# Patient Record
Sex: MALE | Race: BLACK OR AFRICAN AMERICAN | NOT HISPANIC OR LATINO
[De-identification: names, ages, dates, MRNs, and addresses within clinical notes are randomized per-mention and may not be internally consistent; named-entity substitution may affect disease eponyms.]

---

## 2017-04-10 ENCOUNTER — APPOINTMENT (OUTPATIENT)
Dept: GASTROENTEROLOGY | Facility: CLINIC | Age: 62
End: 2017-04-10

## 2017-06-01 ENCOUNTER — RESULT REVIEW (OUTPATIENT)
Age: 62
End: 2017-06-01

## 2018-04-09 ENCOUNTER — EMERGENCY (EMERGENCY)
Facility: HOSPITAL | Age: 63
LOS: 1 days | Discharge: HOME | End: 2018-04-09
Attending: EMERGENCY MEDICINE | Admitting: EMERGENCY MEDICINE
Payer: COMMERCIAL

## 2018-04-09 VITALS
RESPIRATION RATE: 17 BRPM | SYSTOLIC BLOOD PRESSURE: 138 MMHG | TEMPERATURE: 98 F | OXYGEN SATURATION: 95 % | HEART RATE: 55 BPM | DIASTOLIC BLOOD PRESSURE: 80 MMHG

## 2018-04-09 VITALS
SYSTOLIC BLOOD PRESSURE: 142 MMHG | HEART RATE: 70 BPM | DIASTOLIC BLOOD PRESSURE: 88 MMHG | RESPIRATION RATE: 16 BRPM | HEIGHT: 70 IN | WEIGHT: 210.1 LBS | OXYGEN SATURATION: 98 % | TEMPERATURE: 98 F

## 2018-04-09 LAB
ALBUMIN SERPL ELPH-MCNC: 4.1 G/DL — SIGNIFICANT CHANGE UP (ref 3.3–5)
ALP SERPL-CCNC: 78 U/L — SIGNIFICANT CHANGE UP (ref 40–120)
ALT FLD-CCNC: 30 U/L — SIGNIFICANT CHANGE UP (ref 12–78)
ANION GAP SERPL CALC-SCNC: 8 MMOL/L — SIGNIFICANT CHANGE UP (ref 5–17)
AST SERPL-CCNC: 29 U/L — SIGNIFICANT CHANGE UP (ref 15–37)
BASOPHILS # BLD AUTO: 0.1 K/UL — SIGNIFICANT CHANGE UP (ref 0–0.2)
BASOPHILS NFR BLD AUTO: 1.7 % — SIGNIFICANT CHANGE UP (ref 0–2)
BILIRUB SERPL-MCNC: 0.3 MG/DL — SIGNIFICANT CHANGE UP (ref 0.2–1.2)
BUN SERPL-MCNC: 20 MG/DL — SIGNIFICANT CHANGE UP (ref 7–23)
CALCIUM SERPL-MCNC: 8.5 MG/DL — SIGNIFICANT CHANGE UP (ref 8.5–10.1)
CHLORIDE SERPL-SCNC: 105 MMOL/L — SIGNIFICANT CHANGE UP (ref 96–108)
CK MB BLD-MCNC: 0.6 % — SIGNIFICANT CHANGE UP (ref 0–3.5)
CK MB BLD-MCNC: 0.6 % — SIGNIFICANT CHANGE UP (ref 0–3.5)
CK MB CFR SERPL CALC: 2.4 NG/ML — SIGNIFICANT CHANGE UP (ref 0–3.6)
CK MB CFR SERPL CALC: 2.6 NG/ML — SIGNIFICANT CHANGE UP (ref 0–3.6)
CK SERPL-CCNC: 428 U/L — HIGH (ref 26–308)
CK SERPL-CCNC: 455 U/L — HIGH (ref 26–308)
CO2 SERPL-SCNC: 26 MMOL/L — SIGNIFICANT CHANGE UP (ref 22–31)
CREAT SERPL-MCNC: 1.4 MG/DL — HIGH (ref 0.5–1.3)
D DIMER BLD IA.RAPID-MCNC: <150 NG/ML DDU — SIGNIFICANT CHANGE UP
EOSINOPHIL # BLD AUTO: 0.1 K/UL — SIGNIFICANT CHANGE UP (ref 0–0.5)
EOSINOPHIL NFR BLD AUTO: 3.2 % — SIGNIFICANT CHANGE UP (ref 0–6)
GLUCOSE SERPL-MCNC: 83 MG/DL — SIGNIFICANT CHANGE UP (ref 70–99)
HCT VFR BLD CALC: 45.5 % — SIGNIFICANT CHANGE UP (ref 39–50)
HGB BLD-MCNC: 15 G/DL — SIGNIFICANT CHANGE UP (ref 13–17)
LYMPHOCYTES # BLD AUTO: 1.7 K/UL — SIGNIFICANT CHANGE UP (ref 1–3.3)
LYMPHOCYTES # BLD AUTO: 41.1 % — SIGNIFICANT CHANGE UP (ref 13–44)
MCHC RBC-ENTMCNC: 30.7 PG — SIGNIFICANT CHANGE UP (ref 27–34)
MCHC RBC-ENTMCNC: 33.1 GM/DL — SIGNIFICANT CHANGE UP (ref 32–36)
MCV RBC AUTO: 92.7 FL — SIGNIFICANT CHANGE UP (ref 80–100)
MONOCYTES # BLD AUTO: 0.6 K/UL — SIGNIFICANT CHANGE UP (ref 0–0.9)
MONOCYTES NFR BLD AUTO: 13.3 % — HIGH (ref 1–9)
NEUTROPHILS # BLD AUTO: 1.7 K/UL — LOW (ref 1.8–7.4)
NEUTROPHILS NFR BLD AUTO: 40.7 % — LOW (ref 43–77)
PLATELET # BLD AUTO: 174 K/UL — SIGNIFICANT CHANGE UP (ref 150–400)
POTASSIUM SERPL-MCNC: 3.8 MMOL/L — SIGNIFICANT CHANGE UP (ref 3.5–5.3)
POTASSIUM SERPL-SCNC: 3.8 MMOL/L — SIGNIFICANT CHANGE UP (ref 3.5–5.3)
PROT SERPL-MCNC: 7.7 G/DL — SIGNIFICANT CHANGE UP (ref 6–8.3)
RBC # BLD: 4.9 M/UL — SIGNIFICANT CHANGE UP (ref 4.2–5.8)
RBC # FLD: 13.6 % — SIGNIFICANT CHANGE UP (ref 10.3–14.5)
SODIUM SERPL-SCNC: 139 MMOL/L — SIGNIFICANT CHANGE UP (ref 135–145)
TROPONIN I SERPL-MCNC: <.015 NG/ML — SIGNIFICANT CHANGE UP (ref 0.01–0.04)
TROPONIN I SERPL-MCNC: <.015 NG/ML — SIGNIFICANT CHANGE UP (ref 0.01–0.04)
WBC # BLD: 4.2 K/UL — SIGNIFICANT CHANGE UP (ref 3.8–10.5)
WBC # FLD AUTO: 4.2 K/UL — SIGNIFICANT CHANGE UP (ref 3.8–10.5)

## 2018-04-09 PROCEDURE — 99285 EMERGENCY DEPT VISIT HI MDM: CPT

## 2018-04-09 PROCEDURE — 71275 CT ANGIOGRAPHY CHEST: CPT | Mod: 26

## 2018-04-09 PROCEDURE — 84484 ASSAY OF TROPONIN QUANT: CPT

## 2018-04-09 PROCEDURE — 71046 X-RAY EXAM CHEST 2 VIEWS: CPT

## 2018-04-09 PROCEDURE — 93010 ELECTROCARDIOGRAM REPORT: CPT

## 2018-04-09 PROCEDURE — 82553 CREATINE MB FRACTION: CPT

## 2018-04-09 PROCEDURE — 80053 COMPREHEN METABOLIC PANEL: CPT

## 2018-04-09 PROCEDURE — 71275 CT ANGIOGRAPHY CHEST: CPT

## 2018-04-09 PROCEDURE — 85027 COMPLETE CBC AUTOMATED: CPT

## 2018-04-09 PROCEDURE — 71046 X-RAY EXAM CHEST 2 VIEWS: CPT | Mod: 26

## 2018-04-09 PROCEDURE — 36415 COLL VENOUS BLD VENIPUNCTURE: CPT

## 2018-04-09 PROCEDURE — 99284 EMERGENCY DEPT VISIT MOD MDM: CPT

## 2018-04-09 PROCEDURE — 99284 EMERGENCY DEPT VISIT MOD MDM: CPT | Mod: 25

## 2018-04-09 PROCEDURE — 93005 ELECTROCARDIOGRAM TRACING: CPT

## 2018-04-09 PROCEDURE — 82550 ASSAY OF CK (CPK): CPT

## 2018-04-09 PROCEDURE — 85379 FIBRIN DEGRADATION QUANT: CPT

## 2018-04-09 RX ORDER — SODIUM CHLORIDE 9 MG/ML
1000 INJECTION INTRAMUSCULAR; INTRAVENOUS; SUBCUTANEOUS ONCE
Qty: 0 | Refills: 0 | Status: COMPLETED | OUTPATIENT
Start: 2018-04-09 | End: 2018-04-09

## 2018-04-09 RX ORDER — AZITHROMYCIN 500 MG/1
1 TABLET, FILM COATED ORAL
Qty: 4 | Refills: 0
Start: 2018-04-09 | End: 2018-04-12

## 2018-04-09 RX ORDER — ASPIRIN/CALCIUM CARB/MAGNESIUM 324 MG
325 TABLET ORAL ONCE
Qty: 0 | Refills: 0 | Status: COMPLETED | OUTPATIENT
Start: 2018-04-09 | End: 2018-04-09

## 2018-04-09 RX ORDER — AZITHROMYCIN 500 MG/1
500 TABLET, FILM COATED ORAL ONCE
Qty: 0 | Refills: 0 | Status: COMPLETED | OUTPATIENT
Start: 2018-04-09 | End: 2018-04-09

## 2018-04-09 RX ORDER — SODIUM CHLORIDE 9 MG/ML
3 INJECTION INTRAMUSCULAR; INTRAVENOUS; SUBCUTANEOUS ONCE
Qty: 0 | Refills: 0 | Status: COMPLETED | OUTPATIENT
Start: 2018-04-09 | End: 2018-04-09

## 2018-04-09 RX ADMIN — Medication 325 MILLIGRAM(S): at 18:56

## 2018-04-09 RX ADMIN — AZITHROMYCIN 500 MILLIGRAM(S): 500 TABLET, FILM COATED ORAL at 22:49

## 2018-04-09 RX ADMIN — SODIUM CHLORIDE 1000 MILLILITER(S): 9 INJECTION INTRAMUSCULAR; INTRAVENOUS; SUBCUTANEOUS at 18:00

## 2018-04-09 RX ADMIN — SODIUM CHLORIDE 3 MILLILITER(S): 9 INJECTION INTRAMUSCULAR; INTRAVENOUS; SUBCUTANEOUS at 17:35

## 2018-04-09 NOTE — CONSULT NOTE ADULT - ASSESSMENT
62 M w hx of HTN with nonanginal CP.   - The patient has symptoms of atypical chest pain. The patient's chest pain is unlikely related to an acute coronary syndrome. Trend Ce. The EKG does not have any significant ischemic changes. No signs of volume overload.    - Cont home medications at BP and HR are controlled.   - At this point, the patient can be discharged from a cardiac standpoint if [place] are negative and will followup as an outpt  with his cardiologist for further cardiac testing.

## 2018-04-09 NOTE — ED ADULT NURSE NOTE - OBJECTIVE STATEMENT
Received the patient in the Er. patient is alert and oriented. skin warm and dry. C/O chest pain since yesterday with some back pain.

## 2018-04-09 NOTE — ED PROVIDER NOTE - PROGRESS NOTE DETAILS
Pt seen by reuben Giordano to dc home if 9pm set CE ok. Pt will fu in office. At length discussion with patient regarding nature of the chest pain. Discussed results of all diagnostic testing in ED. Discussed chest pain precautions and instructions. Patient demonstrates understanding that a cardiac cause of the chest pain has not been totally excluded, but at this time there is no evidence to warrant an inpatient workup.  Discussed the importance of continued follow-up with Cardiology as outpatient to complete cardiac workup.   All results were explained to patient, including CT chest findings, need for outpt repeat testing with PMD.  and a copy of all available results given.

## 2018-04-09 NOTE — ED PROVIDER NOTE - OBJECTIVE STATEMENT
61 yo M p/w developed CP yeaterday am ~ 10 am at rest. Pain waxes /wains but never resolved since onset. NO sob. no palp/dizzy. no URRUTIA / easy fatigue. no numb/ting/focal weak. no recent URRUTIA / easy fatigue. no recent travel / immob. NO agg/allev factors. NO recent trauma. NO leg pain / swelling. No neuro sx. No agg/allev factors. No other inj or co.

## 2018-04-09 NOTE — ED PROVIDER NOTE - CHPI ED SYMPTOMS NEG
no dizziness/no fever/no cough/no chills/no syncope/no diaphoresis/no nausea/no vomiting/no shortness of breath

## 2018-04-09 NOTE — CONSULT NOTE ADULT - SUBJECTIVE AND OBJECTIVE BOX
CHIEF COMPLAINT: Patient is a 62y old  Male who presents with a chief complaint of chest pain    HPI: 62 M w hx of HTN presents for chest pain. He had back pain yesterday in that radiated to the chest and resolved after taking NSAIDs. This morning had intermittent midsternal nonradiating chest pain, nonexertional, self limiting lasting for minutes. became concerned so came to ED. Has not had any recurrent symptoms.   Denies any  dyspnea, palpitations, PND, orthopnea, near syncope, syncope, lower extremity edema, stroke like symptoms.   Has a cardiologist in [place]. No recent ischemic eval    EKG: SR with nonspecific T wave changes    REVIEW OF SYSTEMS:   All other review of systems are negative unless indicated above    PAST MEDICAL & SURGICAL HISTORY:  Hypertension      SOCIAL HISTORY:  No tobacco, ethanol, or drug abuse.    FAMILY HISTORY:    No family history of acute MI or sudden cardiac death.           Allergies    gentamicin (Unknown)    Intolerances        Home meds:  Home Medications:  aspirin:  orally once a day (09 Apr 2018 17:29)  Bystolic:  orally once a day (09 Apr 2018 17:29)  NIFEdipine:  orally once a day (09 Apr 2018 17:29)        VITAL SIGNS:   Vital Signs Last 24 Hrs  T(C): 36.7 (09 Apr 2018 17:26), Max: 36.7 (09 Apr 2018 17:26)  T(F): 98 (09 Apr 2018 17:26), Max: 98 (09 Apr 2018 17:26)  HR: 74 (09 Apr 2018 18:57) (70 - 74)  BP: 143/83 (09 Apr 2018 18:57) (130/60 - 143/83)  BP(mean): --  RR: 16 (09 Apr 2018 17:26) (16 - 16)  SpO2: 98% (09 Apr 2018 17:26) (98% - 98%)    I&O's Summary      On Exam:     Constitutional: NAD, awake and alert, well-developed  HEENT: Moist Mucous Membranes, Anicteric  Pulmonary: Non-labored, breath sounds are clear bilaterally, No wheezing, rales or rhonchi  Cardiovascular: Regular, S1 and S2, No murmurs, rubs, gallops or clicks  Gastrointestinal: Bowel Sounds present, soft, nontender.   Lymph: No peripheral edema. No lymphadenopathy.  Skin: No visible rashes or ulcers.  Psych:  Mood & affect appropriate    LABS: All Labs Reviewed:                        15.0   4.2   )-----------( 174      ( 09 Apr 2018 17:51 )             45.5     09 Apr 2018 17:51    139    |  105    |  20     ----------------------------<  83     3.8     |  26     |  1.40     Ca    8.5        09 Apr 2018 17:51    TPro  7.7    /  Alb  4.1    /  TBili  0.3    /  DBili  x      /  AST  29     /  ALT  30     /  AlkPhos  78     09 Apr 2018 17:51      CARDIAC MARKERS ( 09 Apr 2018 17:51 )  <.015 ng/mL / x     / 455 U/L / x     / 2.6 ng/mL      Blood Culture:         RADIOLOGY:    < from: CT Angio Chest w/ IV Cont (04.09.18 @ 19:19) >    EXAM:  CT ANGIO CHEST (W)AW IC                            PROCEDURE DATE:  04/09/2018          INTERPRETATION:  CLINICAL INFORMATION:  Chest pain radiating to back.    PROCEDURE:  Using multislice helical technique,  CT angiography, 1.5 mm   sections were obtained  following the intravenous administration of 64   ccs of Omnipaque 350.  Multiplanar MIP reconstructed images were obtained.    FINDINGS:      There is no evidence of intraluminal filling defects to suggest central   or interlobar pulmonary emboli.   No segmental pulmonary artery filling defect is noted.    There is arteriosclerotic calcification of the thoracic aorta with   ectasia of the ascending aorta measuring up to 4 cm.  There is coronary artery calcification.  No pericardial effusion is noted.    No enlarged mediastinal or hilar lymphadenopathy is noted.    There is a small, poorly defined groundglass opacity in the anterior left   upper lobe. Finding may represent sequelae of inflammatory or infectious   airway disease.  No lobar lung consolidation pleural effusion or pneumothorax is noted.  There is minimal scarring/fibrosis left lung base.    The central airways remain patent.    There are multilevel degenerative changes of the thoracic spine.    Impression:    No CT evidence for acute pulmonary embolism.    Small groundglass opacity left upper lobe.  Recommend short interval follow-up chest CT examination in 12 weeks.    Other findings as discussed above.                          MIROSLAVA MCKEON M.D., ATTENDING RADIOLOGIST  This document has been electronically signed. Apr 9 2018  7:38PM                < end of copied text >

## 2018-04-27 ENCOUNTER — APPOINTMENT (OUTPATIENT)
Dept: CARDIOLOGY | Facility: CLINIC | Age: 63
End: 2018-04-27
Payer: COMMERCIAL

## 2018-04-27 ENCOUNTER — NON-APPOINTMENT (OUTPATIENT)
Age: 63
End: 2018-04-27

## 2018-04-27 VITALS
WEIGHT: 211 LBS | SYSTOLIC BLOOD PRESSURE: 126 MMHG | OXYGEN SATURATION: 96 % | HEIGHT: 70 IN | HEART RATE: 52 BPM | BODY MASS INDEX: 30.21 KG/M2 | DIASTOLIC BLOOD PRESSURE: 85 MMHG

## 2018-04-27 PROCEDURE — 99215 OFFICE O/P EST HI 40 MIN: CPT

## 2018-04-27 PROCEDURE — 93000 ELECTROCARDIOGRAM COMPLETE: CPT

## 2018-04-27 RX ORDER — AZITHROMYCIN 250 MG/1
250 TABLET, FILM COATED ORAL
Qty: 4 | Refills: 0 | Status: DISCONTINUED | COMMUNITY
Start: 2018-04-09

## 2018-04-27 RX ORDER — CLOBETASOL PROPIONATE 0.5 MG/G
0.05 CREAM TOPICAL
Qty: 60 | Refills: 0 | Status: DISCONTINUED | COMMUNITY
Start: 2017-09-26

## 2018-04-27 RX ORDER — BETAMETHASONE DIPROPIONATE 0.5 MG/G
0.05 OINTMENT TOPICAL
Qty: 45 | Refills: 0 | Status: DISCONTINUED | COMMUNITY
Start: 2017-09-27

## 2018-04-27 RX ORDER — CLOTRIMAZOLE 10 MG/G
1 CREAM TOPICAL
Qty: 45 | Refills: 0 | Status: DISCONTINUED | COMMUNITY
Start: 2017-09-26

## 2018-05-16 ENCOUNTER — APPOINTMENT (OUTPATIENT)
Dept: CARDIOLOGY | Facility: CLINIC | Age: 63
End: 2018-05-16
Payer: COMMERCIAL

## 2018-05-16 PROCEDURE — 93306 TTE W/DOPPLER COMPLETE: CPT

## 2018-08-22 ENCOUNTER — RX RENEWAL (OUTPATIENT)
Age: 63
End: 2018-08-22

## 2018-08-22 RX ORDER — NEBIVOLOL HYDROCHLORIDE 10 MG/1
10 TABLET ORAL DAILY
Qty: 90 | Refills: 3 | Status: ACTIVE | COMMUNITY
Start: 2017-07-17 | End: 1900-01-01

## 2018-08-22 RX ORDER — ROSUVASTATIN CALCIUM 20 MG/1
20 TABLET, FILM COATED ORAL DAILY
Qty: 90 | Refills: 3 | Status: ACTIVE | COMMUNITY
Start: 2017-11-16 | End: 1900-01-01

## 2018-09-27 ENCOUNTER — APPOINTMENT (OUTPATIENT)
Dept: CARDIOLOGY | Facility: CLINIC | Age: 63
End: 2018-09-27

## 2018-11-01 ENCOUNTER — APPOINTMENT (OUTPATIENT)
Dept: CARDIOLOGY | Facility: CLINIC | Age: 63
End: 2018-11-01
Payer: COMMERCIAL

## 2018-11-01 ENCOUNTER — NON-APPOINTMENT (OUTPATIENT)
Age: 63
End: 2018-11-01

## 2018-11-01 VITALS
DIASTOLIC BLOOD PRESSURE: 80 MMHG | HEART RATE: 56 BPM | SYSTOLIC BLOOD PRESSURE: 129 MMHG | WEIGHT: 217 LBS | BODY MASS INDEX: 31.07 KG/M2 | HEIGHT: 70 IN | OXYGEN SATURATION: 97 %

## 2018-11-01 PROCEDURE — 93000 ELECTROCARDIOGRAM COMPLETE: CPT

## 2018-11-01 PROCEDURE — 99214 OFFICE O/P EST MOD 30 MIN: CPT

## 2018-11-01 RX ORDER — ASPIRIN 81 MG/1
81 TABLET ORAL
Refills: 3 | Status: ACTIVE | COMMUNITY
Start: 2018-11-01

## 2018-11-01 RX ORDER — ASPIRIN 325 MG/1
325 TABLET, FILM COATED ORAL DAILY
Refills: 0 | Status: DISCONTINUED | COMMUNITY
Start: 2018-04-27 | End: 2018-11-01

## 2018-11-05 LAB
BASOPHILS # BLD AUTO: 0.02 K/UL
BASOPHILS NFR BLD AUTO: 0.6 %
EOSINOPHIL # BLD AUTO: 0.23 K/UL
EOSINOPHIL NFR BLD AUTO: 6.4 %
HCT VFR BLD CALC: 46 %
HGB BLD-MCNC: 15.2 G/DL
IMM GRANULOCYTES NFR BLD AUTO: 0 %
LYMPHOCYTES # BLD AUTO: 1.55 K/UL
LYMPHOCYTES NFR BLD AUTO: 42.9 %
MAN DIFF?: NORMAL
MCHC RBC-ENTMCNC: 29.8 PG
MCHC RBC-ENTMCNC: 33 GM/DL
MCV RBC AUTO: 90.2 FL
MONOCYTES # BLD AUTO: 0.47 K/UL
MONOCYTES NFR BLD AUTO: 13 %
NEUTROPHILS # BLD AUTO: 1.34 K/UL
NEUTROPHILS NFR BLD AUTO: 37.1 %
PLATELET # BLD AUTO: 171 K/UL
RBC # BLD: 5.1 M/UL
RBC # FLD: 13.9 %
WBC # FLD AUTO: 3.61 K/UL

## 2018-11-08 LAB
ALBUMIN SERPL ELPH-MCNC: 4.5 G/DL
ALP BLD-CCNC: 87 U/L
ALT SERPL-CCNC: 20 U/L
ANION GAP SERPL CALC-SCNC: 13 MMOL/L
APPEARANCE: CLEAR
AST SERPL-CCNC: 30 U/L
BACTERIA: NEGATIVE
BILIRUB SERPL-MCNC: 0.4 MG/DL
BILIRUBIN URINE: NEGATIVE
BLOOD URINE: NEGATIVE
BUN SERPL-MCNC: 16 MG/DL
CALCIUM SERPL-MCNC: 9.6 MG/DL
CHLORIDE SERPL-SCNC: 100 MMOL/L
CHOLEST SERPL-MCNC: 137 MG/DL
CHOLEST/HDLC SERPL: 2.9 RATIO
CO2 SERPL-SCNC: 26 MMOL/L
COLOR: YELLOW
CREAT SERPL-MCNC: 1.6 MG/DL
GLUCOSE QUALITATIVE U: NEGATIVE MG/DL
GLUCOSE SERPL-MCNC: 96 MG/DL
HBA1C MFR BLD HPLC: 6 %
HDLC SERPL-MCNC: 47 MG/DL
KETONES URINE: NEGATIVE
LDLC SERPL CALC-MCNC: 76 MG/DL
LEUKOCYTE ESTERASE URINE: NEGATIVE
MICROSCOPIC-UA: NORMAL
NITRITE URINE: NEGATIVE
PH URINE: 7.5
POTASSIUM SERPL-SCNC: 4 MMOL/L
PROT SERPL-MCNC: 7.6 G/DL
PROTEIN URINE: NEGATIVE MG/DL
PSA SERPL-MCNC: 2.72 NG/ML
RED BLOOD CELLS URINE: 1 /HPF
SODIUM SERPL-SCNC: 139 MMOL/L
SPECIFIC GRAVITY URINE: 1.02
SQUAMOUS EPITHELIAL CELLS: 0 /HPF
TRIGL SERPL-MCNC: 68 MG/DL
UROBILINOGEN URINE: NEGATIVE MG/DL
WHITE BLOOD CELLS URINE: 0 /HPF

## 2019-01-10 ENCOUNTER — APPOINTMENT (OUTPATIENT)
Dept: INTERNAL MEDICINE | Facility: CLINIC | Age: 64
End: 2019-01-10
Payer: COMMERCIAL

## 2019-01-10 VITALS
HEIGHT: 70 IN | BODY MASS INDEX: 31.07 KG/M2 | SYSTOLIC BLOOD PRESSURE: 120 MMHG | OXYGEN SATURATION: 97 % | RESPIRATION RATE: 14 BRPM | TEMPERATURE: 98.3 F | WEIGHT: 217 LBS | DIASTOLIC BLOOD PRESSURE: 80 MMHG | HEART RATE: 70 BPM

## 2019-01-10 DIAGNOSIS — Z78.9 OTHER SPECIFIED HEALTH STATUS: ICD-10-CM

## 2019-01-10 DIAGNOSIS — Z00.00 ENCOUNTER FOR GENERAL ADULT MEDICAL EXAMINATION W/OUT ABNORMAL FINDINGS: ICD-10-CM

## 2019-01-10 PROCEDURE — 99386 PREV VISIT NEW AGE 40-64: CPT

## 2019-01-11 NOTE — ASSESSMENT
[FreeTextEntry1] : HTN: Controlled. On nifedipine, bystolic. \par HLD: Check lipids. On rosuvastatin. \par HCM: Check labs. Declined HIV, hep C. \par

## 2019-01-11 NOTE — HEALTH RISK ASSESSMENT
[Good] : ~his/her~  mood as  good [0] : 2) Feeling down, depressed, or hopeless: Not at all (0) [Employed] : employed [] :  [# Of Children ___] : has [unfilled] children [Fully functional (bathing, dressing, toileting, transferring, walking, feeding)] : Fully functional (bathing, dressing, toileting, transferring, walking, feeding) [Fully functional (using the telephone, shopping, preparing meals, housekeeping, doing laundry, using] : Fully functional and needs no help or supervision to perform IADLs (using the telephone, shopping, preparing meals, housekeeping, doing laundry, using transportation, managing medications and managing finances) [Smoke Detector] : smoke detector [Carbon Monoxide Detector] : carbon monoxide detector [Seat Belt] :  uses seat belt [] : No [VVE1Sbium] : 0 [HIV test declined] : HIV test declined [Change in mental status noted] : No change in mental status noted [Reports changes in hearing] : Reports no changes in hearing [Reports changes in vision] : Reports no changes in vision [de-identified] :  aer[employer]

## 2019-01-11 NOTE — REVIEW OF SYSTEMS
[Fever] : no fever [Chills] : no chills [Night Sweats] : no night sweats [Discharge] : no discharge [Vision Problems] : no vision problems [Itching] : no itching [Earache] : no earache [Nasal Discharge] : no nasal discharge [Sore Throat] : no sore throat [Chest Pain] : no chest pain [Palpitations] : no palpitations [Lower Ext Edema] : no lower extremity edema [Shortness Of Breath] : no shortness of breath [Wheezing] : no wheezing [Cough] : no cough [Dyspnea on Exertion] : not dyspnea on exertion [Abdominal Pain] : no abdominal pain [Nausea] : no nausea [Constipation] : no constipation [Diarrhea] : no diarrhea [Vomiting] : no vomiting [Melena] : no melena [Dysuria] : no dysuria [Muscle Pain] : no muscle pain [Muscle Weakness] : no muscle weakness [Mole Changes] : no mole changes [Skin Rash] : no skin rash [Headache] : no headache [Dizziness] : no dizziness [Confusion] : no confusion [Suicidal] : not suicidal [Anxiety] : no anxiety [Depression] : no depression [Easy Bleeding] : no easy bleeding [Easy Bruising] : no easy bruising [Swollen Glands] : no swollen glands

## 2019-01-14 LAB
ALBUMIN SERPL ELPH-MCNC: 4.6 G/DL
ALP BLD-CCNC: 80 U/L
ALT SERPL-CCNC: 17 U/L
ANION GAP SERPL CALC-SCNC: 10 MMOL/L
APPEARANCE: CLEAR
AST SERPL-CCNC: 25 U/L
BACTERIA: NEGATIVE
BASOPHILS # BLD AUTO: 0.02 K/UL
BASOPHILS NFR BLD AUTO: 0.5 %
BILIRUB SERPL-MCNC: 0.3 MG/DL
BILIRUBIN URINE: NEGATIVE
BLOOD URINE: NEGATIVE
BUN SERPL-MCNC: 14 MG/DL
CALCIUM SERPL-MCNC: 9 MG/DL
CHLORIDE SERPL-SCNC: 102 MMOL/L
CHOLEST SERPL-MCNC: 138 MG/DL
CHOLEST/HDLC SERPL: 2.9 RATIO
CO2 SERPL-SCNC: 27 MMOL/L
COLOR: YELLOW
CREAT SERPL-MCNC: 1.36 MG/DL
CREAT SPEC-SCNC: 119 MG/DL
CREAT/PROT UR: 0.1 RATIO
EOSINOPHIL # BLD AUTO: 0.16 K/UL
EOSINOPHIL NFR BLD AUTO: 3.9 %
GLUCOSE QUALITATIVE U: NEGATIVE MG/DL
GLUCOSE SERPL-MCNC: 92 MG/DL
HBA1C MFR BLD HPLC: 6 %
HCT VFR BLD CALC: 44.3 %
HDLC SERPL-MCNC: 47 MG/DL
HGB BLD-MCNC: 14.7 G/DL
IMM GRANULOCYTES NFR BLD AUTO: 0.2 %
KETONES URINE: NEGATIVE
LDLC SERPL CALC-MCNC: 80 MG/DL
LEUKOCYTE ESTERASE URINE: NEGATIVE
LYMPHOCYTES # BLD AUTO: 1.8 K/UL
LYMPHOCYTES NFR BLD AUTO: 44.1 %
MAN DIFF?: NORMAL
MCHC RBC-ENTMCNC: 29.9 PG
MCHC RBC-ENTMCNC: 33.2 GM/DL
MCV RBC AUTO: 90.2 FL
MICROSCOPIC-UA: NORMAL
MONOCYTES # BLD AUTO: 0.47 K/UL
MONOCYTES NFR BLD AUTO: 11.5 %
NEUTROPHILS # BLD AUTO: 1.62 K/UL
NEUTROPHILS NFR BLD AUTO: 39.8 %
NITRITE URINE: NEGATIVE
PH URINE: 7.5
PLATELET # BLD AUTO: 189 K/UL
POTASSIUM SERPL-SCNC: 4 MMOL/L
PROT SERPL-MCNC: 7.3 G/DL
PROT UR-MCNC: 11 MG/DL
PROTEIN URINE: NEGATIVE MG/DL
RBC # BLD: 4.91 M/UL
RBC # FLD: 14.1 %
RED BLOOD CELLS URINE: 2 /HPF
SODIUM SERPL-SCNC: 139 MMOL/L
SPECIFIC GRAVITY URINE: 1.02
SQUAMOUS EPITHELIAL CELLS: 0 /HPF
TRIGL SERPL-MCNC: 57 MG/DL
TSH SERPL-ACNC: 1.23 UIU/ML
UROBILINOGEN URINE: NEGATIVE MG/DL
WBC # FLD AUTO: 4.08 K/UL
WHITE BLOOD CELLS URINE: 0 /HPF

## 2019-07-22 ENCOUNTER — APPOINTMENT (OUTPATIENT)
Dept: INTERNAL MEDICINE | Facility: CLINIC | Age: 64
End: 2019-07-22
Payer: COMMERCIAL

## 2019-07-22 VITALS
SYSTOLIC BLOOD PRESSURE: 120 MMHG | OXYGEN SATURATION: 98 % | RESPIRATION RATE: 16 BRPM | DIASTOLIC BLOOD PRESSURE: 80 MMHG | WEIGHT: 216 LBS | HEART RATE: 59 BPM | HEIGHT: 70 IN | TEMPERATURE: 98.2 F | BODY MASS INDEX: 30.92 KG/M2

## 2019-07-22 DIAGNOSIS — M54.5 LOW BACK PAIN: ICD-10-CM

## 2019-07-22 PROCEDURE — 99214 OFFICE O/P EST MOD 30 MIN: CPT

## 2019-07-22 NOTE — PHYSICAL EXAM
[Grossly Normal Strength/Tone] : grossly normal strength/tone [No Acute Distress] : no acute distress [No Spinal Tenderness] : no spinal tenderness [Normal Gait] : normal gait [No Focal Deficits] : no focal deficits

## 2019-07-22 NOTE — REVIEW OF SYSTEMS
[Fever] : no fever [Chills] : no chills [Chest Pain] : no chest pain [Night Sweats] : no night sweats [Palpitations] : no palpitations [Lower Ext Edema] : no lower extremity edema [Shortness Of Breath] : no shortness of breath [Wheezing] : no wheezing [Cough] : no cough [Dyspnea on Exertion] : not dyspnea on exertion [Constipation] : no constipation [Nausea] : no nausea [Abdominal Pain] : no abdominal pain [Diarrhea] : no diarrhea [Vomiting] : no vomiting [Dysuria] : no dysuria

## 2019-07-22 NOTE — HISTORY OF PRESENT ILLNESS
[Initial Evaluation, This Episode] : an initial evaluation of this episode of [Back Pain] :  back pain [Stiffness] : stiffness [Radiating ___] : non-radiating [Left Low Back] : in the left low back [Gradual] : gradual [Aching] : aching [___ Week(s) Ago] : [unfilled] week(s) ago [Constant] : constantly [Daily] : occur daily [Moderate] : moderate in severity [Unchanged] : unchanged [Sitting] : sitting [Fever] : no fever [NSAIDs] : nonsteroidal anti-inflammatory drugs [Rash] : no rash [Leg Numbness] : no leg numbness [Leg Weakness] : no leg weakness [Saddle Paresthesia] : no saddle paresthesia [Fecal Incontinence] : no fecal incontinence [Urinary Incontinence] : no urinary incontinence

## 2019-07-22 NOTE — ASSESSMENT
[FreeTextEntry1] : Lower back pain: Most likely strain. No focal signs. Start naproxen and cyclobenzaprine. Follow-up 1 month if persistent. \par

## 2019-10-31 ENCOUNTER — APPOINTMENT (OUTPATIENT)
Dept: CARDIOLOGY | Facility: CLINIC | Age: 64
End: 2019-10-31

## 2019-10-31 DIAGNOSIS — R07.9 CHEST PAIN, UNSPECIFIED: ICD-10-CM

## 2019-10-31 NOTE — HISTORY OF PRESENT ILLNESS
[FreeTextEntry1] : This is a 64 year old man with a history of hypertension and hyperlipidemia who presents to the office for an acute visit.  Most recent blood work formed 1/19 demonstrated an A1c of 6.0. Cholesterol 138, triglycerides 57, HDL 47, and LDL 80.

## 2019-10-31 NOTE — PHYSICAL EXAM
[Normal Appearance] : normal appearance [General Appearance - In No Acute Distress] : no acute distress [Normal Conjunctiva] : the conjunctiva exhibited no abnormalities [Normal Oral Mucosa] : normal oral mucosa [Normal Jugular Venous V Waves Present] : normal jugular venous V waves present [Respiration, Rhythm And Depth] : normal respiratory rhythm and effort [Exaggerated Use Of Accessory Muscles For Inspiration] : no accessory muscle use [Auscultation Breath Sounds / Voice Sounds] : lungs were clear to auscultation bilaterally [Bowel Sounds] : normal bowel sounds [Abdomen Soft] : soft [Abdomen Tenderness] : non-tender [Abnormal Walk] : normal gait [Gait - Sufficient For Exercise Testing] : the gait was sufficient for exercise testing [Nail Clubbing] : no clubbing of the fingernails [Cyanosis, Localized] : no localized cyanosis [Petechial Hemorrhages (___cm)] : no petechial hemorrhages [Skin Color & Pigmentation] : normal skin color and pigmentation [] : no rash [No Venous Stasis] : no venous stasis [Skin Lesions] : no skin lesions [Oriented To Time, Place, And Person] : oriented to person, place, and time [Affect] : the affect was normal [Mood] : the mood was normal [No Anxiety] : not feeling anxious [Not Palpable] : not palpable [No Precordial Heave] : no precordial heave was noted [Normal Rate] : normal [Rhythm Regular] : regular [Normal S1] : normal S1 [Normal S2] : normal S2 [No Gallop] : no gallop heard [No Murmur] : no murmurs heard [2+] : left 2+ [No Abnormalities] : the abdominal aorta was not enlarged and no bruit was heard [No Pitting Edema] : no pitting edema present [FreeTextEntry1] : No JVD [Right Carotid Bruit] : no bruit heard over the right carotid [Left Carotid Bruit] : no bruit heard over the left carotid [Bruit] : no bruit heard

## 2019-10-31 NOTE — REASON FOR VISIT
[Follow-Up - Clinic] : a clinic follow-up of [Chest Pain] : chest pain [Hyperlipidemia] : hyperlipidemia [Hypertension] : hypertension [Medication Management] : Medication management

## 2021-02-23 ENCOUNTER — APPOINTMENT (OUTPATIENT)
Dept: CARDIOLOGY | Facility: CLINIC | Age: 66
End: 2021-02-23

## 2021-10-13 ENCOUNTER — RESULT REVIEW (OUTPATIENT)
Age: 66
End: 2021-10-13

## 2022-08-02 ENCOUNTER — APPOINTMENT (OUTPATIENT)
Dept: CARDIOLOGY | Facility: CLINIC | Age: 67
End: 2022-08-02

## 2022-08-02 ENCOUNTER — NON-APPOINTMENT (OUTPATIENT)
Age: 67
End: 2022-08-02

## 2022-08-02 VITALS
HEIGHT: 70 IN | SYSTOLIC BLOOD PRESSURE: 142 MMHG | WEIGHT: 219 LBS | DIASTOLIC BLOOD PRESSURE: 86 MMHG | BODY MASS INDEX: 31.35 KG/M2 | OXYGEN SATURATION: 98 % | HEART RATE: 48 BPM

## 2022-08-02 DIAGNOSIS — R60.0 LOCALIZED EDEMA: ICD-10-CM

## 2022-08-02 PROCEDURE — 99204 OFFICE O/P NEW MOD 45 MIN: CPT

## 2022-08-02 PROCEDURE — 93000 ELECTROCARDIOGRAM COMPLETE: CPT

## 2022-08-02 RX ORDER — NAPROXEN 500 MG/1
500 TABLET ORAL
Qty: 30 | Refills: 0 | Status: DISCONTINUED | COMMUNITY
Start: 2019-07-22 | End: 2022-08-02

## 2022-08-02 RX ORDER — FUROSEMIDE 40 MG/1
40 TABLET ORAL
Qty: 30 | Refills: 0 | Status: ACTIVE | COMMUNITY
Start: 2022-05-07

## 2022-08-02 RX ORDER — CYCLOBENZAPRINE HYDROCHLORIDE 5 MG/1
5 TABLET, FILM COATED ORAL
Qty: 30 | Refills: 0 | Status: DISCONTINUED | COMMUNITY
Start: 2019-07-22 | End: 2022-08-02

## 2022-08-10 ENCOUNTER — APPOINTMENT (OUTPATIENT)
Dept: CARDIOLOGY | Facility: CLINIC | Age: 67
End: 2022-08-10

## 2022-08-10 PROCEDURE — 93306 TTE W/DOPPLER COMPLETE: CPT

## 2022-09-07 ENCOUNTER — APPOINTMENT (OUTPATIENT)
Dept: UROLOGY | Facility: CLINIC | Age: 67
End: 2022-09-07

## 2022-09-07 VITALS
HEIGHT: 70 IN | WEIGHT: 218 LBS | SYSTOLIC BLOOD PRESSURE: 167 MMHG | BODY MASS INDEX: 31.21 KG/M2 | DIASTOLIC BLOOD PRESSURE: 98 MMHG | OXYGEN SATURATION: 97 % | HEART RATE: 66 BPM

## 2022-09-07 DIAGNOSIS — N40.1 BENIGN PROSTATIC HYPERPLASIA WITH LOWER URINARY TRACT SYMPMS: ICD-10-CM

## 2022-09-07 DIAGNOSIS — N13.8 BENIGN PROSTATIC HYPERPLASIA WITH LOWER URINARY TRACT SYMPMS: ICD-10-CM

## 2022-09-07 PROCEDURE — 99204 OFFICE O/P NEW MOD 45 MIN: CPT

## 2022-09-07 RX ORDER — SULFAMETHOXAZOLE AND TRIMETHOPRIM 800; 160 MG/1; MG/1
800-160 TABLET ORAL TWICE DAILY
Qty: 20 | Refills: 0 | Status: ACTIVE | COMMUNITY
Start: 2022-09-07 | End: 1900-01-01

## 2022-09-08 NOTE — END OF VISIT
[FreeTextEntry3] : Pt will trial an antibiotic with a follow up PSA and a prostate US. Plans to follow.

## 2022-09-08 NOTE — HISTORY OF PRESENT ILLNESS
[FreeTextEntry1] : 6&M presents today with a CC of a PSA elevation. Pt states that he has been having some minor genitourinary symptoms. His PSA has come up over 4. He has not been treated for urinary symptoms in the past.

## 2022-09-27 ENCOUNTER — NON-APPOINTMENT (OUTPATIENT)
Age: 67
End: 2022-09-27

## 2022-09-27 ENCOUNTER — APPOINTMENT (OUTPATIENT)
Dept: CARDIOLOGY | Facility: CLINIC | Age: 67
End: 2022-09-27

## 2022-09-27 VITALS
BODY MASS INDEX: 30.92 KG/M2 | WEIGHT: 216 LBS | OXYGEN SATURATION: 95 % | HEART RATE: 63 BPM | HEIGHT: 70 IN | DIASTOLIC BLOOD PRESSURE: 77 MMHG | SYSTOLIC BLOOD PRESSURE: 142 MMHG

## 2022-09-27 VITALS — SYSTOLIC BLOOD PRESSURE: 122 MMHG | DIASTOLIC BLOOD PRESSURE: 78 MMHG

## 2022-09-27 PROCEDURE — 93000 ELECTROCARDIOGRAM COMPLETE: CPT

## 2022-09-27 PROCEDURE — 99214 OFFICE O/P EST MOD 30 MIN: CPT

## 2022-09-27 RX ORDER — NIFEDIPINE 90 MG/1
90 TABLET, EXTENDED RELEASE ORAL DAILY
Qty: 90 | Refills: 3 | Status: DISCONTINUED | COMMUNITY
Start: 2017-07-17 | End: 2022-09-27

## 2022-09-27 RX ORDER — TELMISARTAN 40 MG/1
40 TABLET ORAL DAILY
Qty: 30 | Refills: 3 | Status: ACTIVE | COMMUNITY
Start: 2022-09-27

## 2022-09-27 NOTE — REASON FOR VISIT
[Follow-Up - Clinic] : a clinic follow-up of [Chest Pain] : chest pain [Hyperlipidemia] : hyperlipidemia [Hypertension] : hypertension [Medication Management] : Medication management [Symptom and Test Evaluation] : symptom and test evaluation

## 2022-09-28 NOTE — CARDIOLOGY SUMMARY
[___] : [unfilled] [None] : normal LV function [de-identified] : Sinus bradycardia. LAFB.  Nonspecific T wave changes.  [de-identified] : 8/2022\par EF 63%\par concentric LV remodeling, mild diastolic dysfunction\par NML RV

## 2022-09-28 NOTE — PHYSICAL EXAM
[Normal Appearance] : normal appearance [General Appearance - In No Acute Distress] : no acute distress [Normal Oral Mucosa] : normal oral mucosa [Normal Jugular Venous V Waves Present] : normal jugular venous V waves present [Respiration, Rhythm And Depth] : normal respiratory rhythm and effort [Exaggerated Use Of Accessory Muscles For Inspiration] : no accessory muscle use [Auscultation Breath Sounds / Voice Sounds] : lungs were clear to auscultation bilaterally [Bowel Sounds] : normal bowel sounds [Abdomen Soft] : soft [Abdomen Tenderness] : non-tender [Abnormal Walk] : normal gait [Gait - Sufficient For Exercise Testing] : the gait was sufficient for exercise testing [Nail Clubbing] : no clubbing of the fingernails [Cyanosis, Localized] : no localized cyanosis [Petechial Hemorrhages (___cm)] : no petechial hemorrhages [Skin Color & Pigmentation] : normal skin color and pigmentation [] : no rash [No Venous Stasis] : no venous stasis [Skin Lesions] : no skin lesions [Oriented To Time, Place, And Person] : oriented to person, place, and time [Affect] : the affect was normal [Mood] : the mood was normal [No Anxiety] : not feeling anxious [Not Palpable] : not palpable [No Precordial Heave] : no precordial heave was noted [Normal Rate] : normal [2+] : left 2+ [Well Developed] : well developed [Well Nourished] : well nourished [No Acute Distress] : no acute distress [Normal Conjunctiva] : normal conjunctiva [Normal Venous Pressure] : normal venous pressure [No Carotid Bruit] : no carotid bruit [Normal S1, S2] : normal S1, S2 [No Rub] : no rub [No Gallop] : no gallop [Rhythm Regular] : regular [Normal S1] : normal S1 [Normal S2] : normal S2 [No Murmur] : no murmurs heard [No Pitting Edema] : no pitting edema present [No Abnormalities] : the abdominal aorta was not enlarged and no bruit was heard [Clear Lung Fields] : clear lung fields [Good Air Entry] : good air entry [No Respiratory Distress] : no respiratory distress  [Soft] : abdomen soft [Non Tender] : non-tender [No Masses/organomegaly] : no masses/organomegaly [Normal Bowel Sounds] : normal bowel sounds [Normal Gait] : normal gait [No Edema] : no edema [No Cyanosis] : no cyanosis [No Clubbing] : no clubbing [No Varicosities] : no varicosities [No Rash] : no rash [No Skin Lesions] : no skin lesions [Moves all extremities] : moves all extremities [No Focal Deficits] : no focal deficits [Normal Speech] : normal speech [Alert and Oriented] : alert and oriented [Normal memory] : normal memory [FreeTextEntry1] : No JVD [Right Carotid Bruit] : no bruit heard over the right carotid [Left Carotid Bruit] : no bruit heard over the left carotid [Bruit] : no bruit heard

## 2022-09-28 NOTE — REVIEW OF SYSTEMS
[Negative] : Heme/Lymph [Lower Ext Edema] : lower extremity edema [SOB] : no shortness of breath [Dyspnea on exertion] : not dyspnea during exertion [Chest Discomfort] : no chest discomfort [Leg Claudication] : no intermittent leg claudication [Palpitations] : no palpitations [Orthopnea] : no orthopnea [Syncope] : no syncope [FreeTextEntry5] : See HPI

## 2022-09-28 NOTE — DISCUSSION/SUMMARY
[FreeTextEntry1] : This is a 67 year old man with a history of hypertension and hyperlipidemia who presents to the office for  followup.\par He is in no acute distress.  He is euvolemic on exam. ECG illustrates sinus bradycardia, unchanged from previous. His blood presssure is controlled. \par I have reviewed the most recent echocardiogram with him.  Normal LV function was demonstrated.  He has mild aortic root dilatation, 4.1 cm.  I have assured him that we will continue to monitor and will continue blood pressure control.\par It is most likely that his complaints of lower extremity swelling is related to nifedipine.  We will discontinue at this time and he will trial telmisartan 40 mg daily moving forward.\par He will go for blood work in the next 2 to 4 weeks.\par He will continue Bystolic 10 mg once a day for now. \par He will follow-up again in 1 month for blood pressure check.\par He knows to call with any questions or concerns.

## 2022-09-28 NOTE — HISTORY OF PRESENT ILLNESS
[FreeTextEntry1] : This is a 66 year old man with a history of hypertension and hyperlipidemia who presents to the office for an acute visit.   He has been noticing ankle edema at the end of the day.  IT completely resolves overnight.  He reports no other symptoms.  He denies chest pain, increased dyspnea, PND, orthopnea,  , dizziness, or syncope.  His BP is controlled when checked at home.  He monitors his HR with his Apple watch, and it is usually in the 50s.  He has no symptoms to suggest symptomatic bradycardia. \par \par \par Previous visit history as above. \par Mr Murillo presents back today after one month.\par He has completed an echocardiogram since last visit and wanted to discuss the results.  \par He has been feeling well, except he still is experiencing lower extremity swelling that he mentioned at last visit.  He feels this is due to Nifedipine and would like to change to a different blood pressure regimen. \par \par He states his LE swelling usually resolves over night , but returns as he is up and walking during the day.

## 2022-10-05 ENCOUNTER — APPOINTMENT (OUTPATIENT)
Dept: UROLOGY | Facility: CLINIC | Age: 67
End: 2022-10-05

## 2022-10-07 ENCOUNTER — APPOINTMENT (OUTPATIENT)
Dept: UROLOGY | Facility: CLINIC | Age: 67
End: 2022-10-07

## 2022-10-07 VITALS
HEIGHT: 70 IN | BODY MASS INDEX: 31.21 KG/M2 | DIASTOLIC BLOOD PRESSURE: 76 MMHG | OXYGEN SATURATION: 97 % | WEIGHT: 218 LBS | SYSTOLIC BLOOD PRESSURE: 123 MMHG | HEART RATE: 60 BPM

## 2022-10-07 PROCEDURE — 51741 ELECTRO-UROFLOWMETRY FIRST: CPT

## 2022-10-07 PROCEDURE — 76872 US TRANSRECTAL: CPT

## 2022-10-07 PROCEDURE — 76857 US EXAM PELVIC LIMITED: CPT

## 2022-10-18 ENCOUNTER — OUTPATIENT (OUTPATIENT)
Dept: OUTPATIENT SERVICES | Facility: HOSPITAL | Age: 67
LOS: 1 days | End: 2022-10-18
Payer: COMMERCIAL

## 2022-10-18 ENCOUNTER — RESULT REVIEW (OUTPATIENT)
Age: 67
End: 2022-10-18

## 2022-10-18 ENCOUNTER — TRANSCRIPTION ENCOUNTER (OUTPATIENT)
Age: 67
End: 2022-10-18

## 2022-10-18 ENCOUNTER — APPOINTMENT (OUTPATIENT)
Dept: MRI IMAGING | Facility: CLINIC | Age: 67
End: 2022-10-18

## 2022-10-18 DIAGNOSIS — R97.20 ELEVATED PROSTATE SPECIFIC ANTIGEN [PSA]: ICD-10-CM

## 2022-10-18 PROCEDURE — 76498P: CUSTOM | Mod: 26

## 2022-10-18 PROCEDURE — 72197 MRI PELVIS W/O & W/DYE: CPT

## 2022-10-18 PROCEDURE — A9585: CPT

## 2022-10-18 PROCEDURE — 72197 MRI PELVIS W/O & W/DYE: CPT | Mod: 26

## 2022-10-18 PROCEDURE — 76498 UNLISTED MR PROCEDURE: CPT

## 2022-10-25 ENCOUNTER — NON-APPOINTMENT (OUTPATIENT)
Age: 67
End: 2022-10-25

## 2022-10-25 DIAGNOSIS — R97.20 ELEVATED PROSTATE, SPECIFIC ANTIGEN [PSA]: ICD-10-CM

## 2022-10-27 ENCOUNTER — NON-APPOINTMENT (OUTPATIENT)
Age: 67
End: 2022-10-27

## 2022-10-27 ENCOUNTER — APPOINTMENT (OUTPATIENT)
Dept: CARDIOLOGY | Facility: CLINIC | Age: 67
End: 2022-10-27

## 2022-10-27 VITALS
BODY MASS INDEX: 31.21 KG/M2 | HEART RATE: 65 BPM | DIASTOLIC BLOOD PRESSURE: 50 MMHG | HEIGHT: 70 IN | SYSTOLIC BLOOD PRESSURE: 143 MMHG | WEIGHT: 218 LBS | OXYGEN SATURATION: 96 %

## 2022-10-27 VITALS — SYSTOLIC BLOOD PRESSURE: 120 MMHG | DIASTOLIC BLOOD PRESSURE: 76 MMHG

## 2022-10-27 DIAGNOSIS — I10 ESSENTIAL (PRIMARY) HYPERTENSION: ICD-10-CM

## 2022-10-27 DIAGNOSIS — E78.5 HYPERLIPIDEMIA, UNSPECIFIED: ICD-10-CM

## 2022-10-27 PROCEDURE — 93000 ELECTROCARDIOGRAM COMPLETE: CPT

## 2022-10-27 PROCEDURE — 99214 OFFICE O/P EST MOD 30 MIN: CPT

## 2022-10-27 NOTE — REASON FOR VISIT
[Symptom and Test Evaluation] : symptom and test evaluation [Follow-Up - Clinic] : a clinic follow-up of [Chest Pain] : chest pain [Hyperlipidemia] : hyperlipidemia [Hypertension] : hypertension [Medication Management] : Medication management

## 2022-10-28 ENCOUNTER — NON-APPOINTMENT (OUTPATIENT)
Age: 67
End: 2022-10-28

## 2022-11-02 NOTE — PHYSICAL EXAM
[Well Developed] : well developed [Well Nourished] : well nourished [No Acute Distress] : no acute distress [Normal Venous Pressure] : normal venous pressure [No Carotid Bruit] : no carotid bruit [Normal S1, S2] : normal S1, S2 [No Rub] : no rub [Clear Lung Fields] : clear lung fields [Good Air Entry] : good air entry [No Respiratory Distress] : no respiratory distress  [Soft] : abdomen soft [Non Tender] : non-tender [No Masses/organomegaly] : no masses/organomegaly [Normal Gait] : normal gait [Normal Bowel Sounds] : normal bowel sounds [No Edema] : no edema [No Cyanosis] : no cyanosis [No Clubbing] : no clubbing [No Varicosities] : no varicosities [No Rash] : no rash [No Skin Lesions] : no skin lesions [Moves all extremities] : moves all extremities [No Focal Deficits] : no focal deficits [Normal Speech] : normal speech [Alert and Oriented] : alert and oriented [Normal memory] : normal memory [Normal Appearance] : normal appearance [General Appearance - In No Acute Distress] : no acute distress [Normal Conjunctiva] : the conjunctiva exhibited no abnormalities [Normal Oral Mucosa] : normal oral mucosa [Normal Jugular Venous V Waves Present] : normal jugular venous V waves present [Respiration, Rhythm And Depth] : normal respiratory rhythm and effort [Exaggerated Use Of Accessory Muscles For Inspiration] : no accessory muscle use [Auscultation Breath Sounds / Voice Sounds] : lungs were clear to auscultation bilaterally [Abdomen Soft] : soft [Bowel Sounds] : normal bowel sounds [Abdomen Tenderness] : non-tender [Abnormal Walk] : normal gait [Gait - Sufficient For Exercise Testing] : the gait was sufficient for exercise testing [Nail Clubbing] : no clubbing of the fingernails [Cyanosis, Localized] : no localized cyanosis [Petechial Hemorrhages (___cm)] : no petechial hemorrhages [Skin Color & Pigmentation] : normal skin color and pigmentation [] : no rash [No Venous Stasis] : no venous stasis [Skin Lesions] : no skin lesions [Oriented To Time, Place, And Person] : oriented to person, place, and time [Affect] : the affect was normal [Mood] : the mood was normal [No Anxiety] : not feeling anxious [Not Palpable] : not palpable [No Precordial Heave] : no precordial heave was noted [Normal Rate] : normal [Rhythm Regular] : regular [Normal S1] : normal S1 [Normal S2] : normal S2 [No Gallop] : no gallop heard [No Murmur] : no murmurs heard [2+] : left 2+ [No Abnormalities] : the abdominal aorta was not enlarged and no bruit was heard [No Pitting Edema] : no pitting edema present [FreeTextEntry1] : No JVD [Right Carotid Bruit] : no bruit heard over the right carotid [Left Carotid Bruit] : no bruit heard over the left carotid [Bruit] : no bruit heard

## 2022-11-02 NOTE — DISCUSSION/SUMMARY
[FreeTextEntry1] : This is a 67 year old man with a history of hypertension and hyperlipidemia who presents to the office for  followup.\par He is in no acute distress.  He is euvolemic on exam. ECG illustrates sinus rhythm, unchanged from previous. His blood pressure is controlled. \par I have reviewed the most recent echocardiogram with him.  Normal LV function was demonstrated.  He has mild aortic root dilatation, 4.1 cm.  I have assured him that we will continue to monitor and will continue blood pressure control.  Stress testing to be done in the near future to define exercise tolerance, rule out exertional hypertensive responses, assess for exercise induced arrhythmias and rule out ischemia from obstructive CAD. \par \par He will continue off of Telmisartan and continue Nifedipine 90mg daily.  He will continue Bystolic 10mg daily. Furosemide 40mg daily as prescribed by his nephrologist.  His most recent blood work demonstrates creat 1.19>1.33\par Continue rosuvastatin.  He most recent LDL is at goal of 76.\par \par He will followup after his biopsy and any intervention that may be scheduled.\par He knows to call with any questions or concerns.

## 2022-11-02 NOTE — REVIEW OF SYSTEMS
[Negative] : Cardiovascular [SOB] : no shortness of breath [Dyspnea on exertion] : not dyspnea during exertion [Chest Discomfort] : no chest discomfort [Lower Ext Edema] : no extremity edema [Leg Claudication] : no intermittent leg claudication [Palpitations] : no palpitations [Orthopnea] : no orthopnea [PND] : no PND [Syncope] : no syncope

## 2022-11-02 NOTE — CARDIOLOGY SUMMARY
[___] : [unfilled] [None] : normal LV function [de-identified] : Sinus bradycardia. LAFB.  Nonspecific T wave changes.  [de-identified] : 8/2022\par EF 63%\par concentric LV remodeling, mild diastolic dysfunction\par NML RV

## 2022-11-02 NOTE — ADDENDUM
[FreeTextEntry1] : pt called back with nephrologist contact information\par \par Dr Mohsen Pahlavan\par \par fax, [phone]

## 2022-11-02 NOTE — HISTORY OF PRESENT ILLNESS
[FreeTextEntry1] : This is a 67 year old man with a history of hypertension and hyperlipidemia who presents to the office for an acute visit.   He has been noticing ankle edema at the end of the day.  IT completely resolves overnight.  He reports no other symptoms.  His BP is controlled when checked at home.  He monitors his HR with his Apple watch, and it is usually in the 50s.  He has no symptoms to suggest symptomatic bradycardia. \par \par Mr Murillo presents back today 10/27/22 for follow-up.  He was seen in the office 1 month ago.  Adjustments were made to his blood pressure medication regimen.  Nifedipine had been discontinued due to complaint of lower extremity swelling.Telmisartan was added to his regimen in exchange.\par He unfortunately reports"bad reaction" to the Telmisartan.  He states his blood pressure was out of control.  He would get readings close to 200s systolic.  He had also began to experience symptoms of palpitations.  He states he tried to take Telmisartan for 3 days but did not want to continue due to uncontrolled blood pressure readings.  He resumed nifedipine 90 mg daily on his own.  He states his blood pressure improved significantly.\par He has had follow-up with his nephrologist who added furosemide 40 mg to his regimen.\par \par He has no further complaints since resuming nifedipine.\par \par He is scheduled for prostate biopsy on November 11, 2022\par \par \par \par \par

## 2022-11-07 RX ORDER — ENEMA 19; 7 G/133ML; G/133ML
7-19 ENEMA RECTAL
Qty: 1 | Refills: 0 | Status: ACTIVE | COMMUNITY
Start: 2022-10-25 | End: 1900-01-01

## 2022-11-10 ENCOUNTER — NON-APPOINTMENT (OUTPATIENT)
Age: 67
End: 2022-11-10

## 2022-11-11 ENCOUNTER — APPOINTMENT (OUTPATIENT)
Dept: UROLOGY | Facility: CLINIC | Age: 67
End: 2022-11-11

## 2022-11-11 VITALS
HEIGHT: 70 IN | OXYGEN SATURATION: 96 % | SYSTOLIC BLOOD PRESSURE: 119 MMHG | WEIGHT: 218 LBS | BODY MASS INDEX: 31.21 KG/M2 | DIASTOLIC BLOOD PRESSURE: 82 MMHG | HEART RATE: 68 BPM

## 2022-11-11 PROCEDURE — 76377 3D RENDER W/INTRP POSTPROCES: CPT | Mod: 59

## 2022-11-11 PROCEDURE — 55700: CPT | Mod: 22

## 2022-11-11 PROCEDURE — 76872 US TRANSRECTAL: CPT | Mod: 59

## 2022-11-14 ENCOUNTER — NON-APPOINTMENT (OUTPATIENT)
Age: 67
End: 2022-11-14

## 2022-11-16 LAB — CORE LAB BIOPSY: NORMAL

## 2022-11-23 ENCOUNTER — APPOINTMENT (OUTPATIENT)
Dept: UROLOGY | Facility: CLINIC | Age: 67
End: 2022-11-23

## 2022-11-23 VITALS
BODY MASS INDEX: 31.92 KG/M2 | OXYGEN SATURATION: 97 % | HEART RATE: 53 BPM | WEIGHT: 223 LBS | HEIGHT: 70 IN | SYSTOLIC BLOOD PRESSURE: 126 MMHG | DIASTOLIC BLOOD PRESSURE: 79 MMHG

## 2022-11-23 PROCEDURE — 99214 OFFICE O/P EST MOD 30 MIN: CPT

## 2022-11-27 NOTE — ASSESSMENT
[FreeTextEntry1] : Pathology report:\par Target lesion: GS 7 (3+4) \par GS 6 (3+3) x 4 cores.\par \par Discussed the pathology report with patient. Discussed surgery and radiation options with patient. Surgery for a robotic radical prostatectomy discussed with patient. \par We discussed all treatment options for treatment of localized prostate cancer.   \par These included active surveillance, radiation therapy, IMRT and Cyberknife and radical prostatectomy\par We discussed the risks, benefits and complications of radiation therapy.   We discussed the option of robotic radical prostatectomy and the advantages and disadvantages.   We discussed the risks, benefits and alternatives and complications specifically impotence and incontinence.  We discussed the procedure, in hospital stay and the recovery and expectations.  We discussed my experience with this procedure and my outcomes.\par \par We discussed the cancer outcomes of each options as well.\par He wants treatment closer to home at [facility]\par He was referred to Rad Onc\par \par \par \par

## 2022-11-27 NOTE — HISTORY OF PRESENT ILLNESS
[None] : no symptoms [FreeTextEntry1] : 66 yo presents today for a follow-up for his prostate biopsy results. \par Fusion transperineal prostate biopsy 11.11.22.\par Pt. tolerated the procedure well and is w/o complications. \par PSA 4.81 (10.17.22.)\par MRI prostate 10.18.22.\par PV 60 cc\par PIRADS 4 lesion. \par Here to review his pathology report.

## 2022-12-13 ENCOUNTER — APPOINTMENT (OUTPATIENT)
Dept: SURGICAL ONCOLOGY | Facility: CLINIC | Age: 67
End: 2022-12-13

## 2022-12-14 ENCOUNTER — NON-APPOINTMENT (OUTPATIENT)
Age: 67
End: 2022-12-14

## 2022-12-14 ENCOUNTER — APPOINTMENT (OUTPATIENT)
Dept: RADIATION ONCOLOGY | Facility: CLINIC | Age: 67
End: 2022-12-14

## 2022-12-15 ENCOUNTER — LABORATORY RESULT (OUTPATIENT)
Age: 67
End: 2022-12-15

## 2022-12-15 ENCOUNTER — APPOINTMENT (OUTPATIENT)
Dept: DERMATOLOGY | Facility: CLINIC | Age: 67
End: 2022-12-15
Payer: COMMERCIAL

## 2022-12-15 DIAGNOSIS — L82.1 OTHER SEBORRHEIC KERATOSIS: ICD-10-CM

## 2022-12-15 PROCEDURE — 11102 TANGNTL BX SKIN SINGLE LES: CPT

## 2022-12-15 PROCEDURE — 99204 OFFICE O/P NEW MOD 45 MIN: CPT | Mod: 25

## 2022-12-15 RX ORDER — MUPIROCIN 20 MG/G
2 OINTMENT TOPICAL
Qty: 1 | Refills: 0 | Status: ACTIVE | COMMUNITY
Start: 2022-12-15 | End: 1900-01-01

## 2022-12-19 ENCOUNTER — APPOINTMENT (OUTPATIENT)
Dept: UROLOGY | Facility: CLINIC | Age: 67
End: 2022-12-19

## 2022-12-19 PROCEDURE — 99215 OFFICE O/P EST HI 40 MIN: CPT

## 2022-12-19 NOTE — HISTORY OF PRESENT ILLNESS
[FreeTextEntry1] : 12/19/2022: Reason for visit: Prostate Cancer Talk\par \par Mr. ESTEBAN is a 67 year old male presenting today for consideration of curative therapy for prostate cancer. He works in [employer]. He is accompanied by his wife. He reports his erection is currently inadequate. Hx of HTN. Denies Hx of DM, CAD and MI. Denies history of any abdominal surgery. The patient asked about biopsying his lymph nodes and i explained the lymph nodes are not biopsied, but a sample of the common landing sites of the landing site will be sent for analysis after the surgery. I also informed the patient that I reviewed the MRI and it showed no extraprostatic extension, seminal vesicle invasion. he is taking aspirin and I instructed him to stop a week before surgery. He reports he stopped drinking alcohol 2 weeks ago. He used to drink 3 beers a day on average. He has subsisted it with zero alcoholic beverage. I recommended him for stopping and advised him to stop to avoid complications after the surgery. He reports swelling in the lower extremities managed with furosemide. He reduced his Bystolic intake by half and now takes Bystolic 5 mg tablet one daily and  i was agreeable he keeps taking 5 mg. \par \par -PSA Screen 2.72 ng/mL on 11/02/2022\par -Clinical Stage: T2\par -Wol[place] 7 (3+4); 7/17 positive cores all on the right side. Biopsy on 11/11/2022. \par Gleasons Grade Group 2 \par -MRI on 10/18/2022 showed "Right, posterior medial (PZpm), [id number]. PIRADS 4 - High (clinically significant cancer is likely to be present). No extraprostatic extension, seminal vesicle invasion, or pelvic lymphadenopathy. Prostate Volume: 60 mL. PSA density: 0.09 ng/mL/mL". \par \par Assessment: \par \par All treatment options were reviewed. This included active surveillance, androgen deprivation, emerging options such as focal therapy/HIFU/cryotherapy, radiation options (including IMRT, SBRT, brachytherapy) and surgical options ( open vs. robotic, nerve vs non-nerve sparing.) Oncologic outcomes were compared and contrasted. Risks of biochemical and clinical recurrence discussed. Risks of needing adjuvant or salvage treatment reviewed. We discussed the the risks of secondary malignancy after radiation. We discussed the opportunity for pathological staging with survey vs other options. We discussed the possibility of positive margins, treatment failure, cancer recurrence and cancer-related death even with treatment. \par \par All potential side effects of treatment were reviewed including, but not limited to: short term or permanent stress urinary incontinence and/or short term or permanent erectile dysfunction, penile shortening, rectal symptoms/pain, perineal pain, and other side effects. \par \par All potential complications of treatment and surgery were reviewed including, but not limited to: risks of conversion from MIS to open surgery discussed, bleeding/life-threatening hemorrhage, rectal injury requiring colostomy or delayed recognition leading to fistula, vascular/bowel/adjacent visceral organ injury, trocar/access injury, the possibility of recognized vs. unrecognized/delayed- recognition injury, risks of thermal/blunt/sharp/retraction injury, risks of DVT, PE, MI, death risks of cardiopulmonary/anesthesia related complications, positional injury, infection/collection/abscess, wound complications/dehiscence/seroma/cellulitis, urinoma/fistula, uretal injury/obstruction, bladder neck contracture, penile shortening, meatal stenosis, urethral stricture, lymphocele, obstructor nerve injury, prolonged anastomotic leak, and other complications.\par \par Plan: Pt will obtain cardiac clearance from his cardiologist before surgery. Olga will contact the patient for the next steps. \par \par I counseled the patient. I discussed the various etiologies of his symptoms. Risks and alternatives were discussed. I answered the patient questions. The patient will follow-up as directed and will contact me with any questions or concerns. Thank you for the opportunity to participate in the care of this patient. I'll keep you updated on his progress.

## 2022-12-19 NOTE — DISEASE MANAGEMENT
[2] : T2 [a] : a [X] : MX [0-10] : 0 -10 ng/mL [Biopsy with Fusion] : Patient had a biopsy with fusion on [6] : Fusion Biopsy Glenroy Score: 6 [] : Patient had a Prostate MRI [4] : 4 [I] : I [BiopsyDate] : 11/11/2022 [MeasuredProstateVolume] : 60 [TotalCores] : 17 [TotalPositiveCores] : 7 [MaxCoreInvolvement] : 70

## 2022-12-27 ENCOUNTER — APPOINTMENT (OUTPATIENT)
Dept: UROLOGY | Facility: CLINIC | Age: 67
End: 2022-12-27

## 2022-12-27 PROCEDURE — 99215 OFFICE O/P EST HI 40 MIN: CPT

## 2022-12-27 NOTE — HISTORY OF PRESENT ILLNESS
[FreeTextEntry1] : Reason For Visit\par \par SHALONDA ESTEBAN is here today for consideration of curative therapy for prostate cancer. \par  \par History of Present Illness\par 12/19/2022: Reason for visit: Prostate Cancer Talk\par \par Mr. ESTEBAN is a 67 year old male presenting today for consideration of curative therapy for prostate cancer. He works in [employer] industry. He is accompanied by his wife. He reports his erection is currently inadequate. Hx of HTN. Denies Hx of DM, CAD and MI. Denies history of any abdominal surgery. The patient asked about biopsying his lymph nodes and i explained the lymph nodes are not biopsied, but a sample of the common landing sites of the landing site will be sent for analysis after the surgery. I also informed the patient that I reviewed the MRI and it showed no extraprostatic extension, seminal vesicle invasion. he is taking aspirin and I instructed him to stop a week before surgery. He reports he stopped drinking alcohol 2 weeks ago. He used to drink 3 beers a day on average. He has subsisted it with zero alcoholic beverage. I recommended him for stopping and advised him to stop to avoid complications after the surgery. He reports swelling in the lower extremities managed with furosemide. He reduced his Bystolic intake by half and now takes Bystolic 5 mg tablet one daily and i was agreeable he keeps taking 5 mg. \par \par -PSA Screen 2.72 ng/mL on 11/02/2022\par -Clinical Stage: T2\par -[place] 7 (3+4); 7/17 positive cores all on the right side. Biopsy on 11/11/2022. \par Gleasons Grade Group 2 \par -MRI on 10/18/2022 showed "Right, posterior medial (PZpm), [id number]. PIRADS 4 - High (clinically significant cancer is likely to be present). No extraprostatic extension, seminal vesicle invasion, or pelvic lymphadenopathy. Prostate Volume: 60 mL. PSA density: 0.09 ng/mL/mL". \par \par Assessment: \par \par All treatment options were reviewed. This included active surveillance, androgen deprivation, emerging options such as focal therapy/HIFU/cryotherapy, radiation options (including IMRT, SBRT, brachytherapy) and surgical options ( open vs. robotic, nerve vs non-nerve sparing.) Oncologic outcomes were compared and contrasted. Risks of biochemical and clinical recurrence discussed. Risks of needing adjuvant or salvage treatment reviewed. We discussed the the risks of secondary malignancy after radiation. We discussed the opportunity for pathological staging with survey vs other options. We discussed the possibility of positive margins, treatment failure, cancer recurrence and cancer-related death even with treatment. \par \par All potential side effects of treatment were reviewed including, but not limited to: short term or permanent stress urinary incontinence and/or short term or permanent erectile dysfunction, penile shortening, rectal symptoms/pain, perineal pain, and other side effects. \par \par All potential complications of treatment and surgery were reviewed including, but not limited to: risks of conversion from MIS to open surgery discussed, bleeding/life-threatening hemorrhage, rectal injury requiring colostomy or delayed recognition leading to fistula, vascular/bowel/adjacent visceral organ injury, trocar/access injury, the possibility of recognized vs. unrecognized/delayed- recognition injury, risks of thermal/blunt/sharp/retraction injury, risks of DVT, PE, MI, death risks of cardiopulmonary/anesthesia related complications, positional injury, infection/collection/abscess, wound complications/dehiscence/seroma/cellulitis, urinoma/fistula, uretal injury/obstruction, bladder neck contracture, penile shortening, meatal stenosis, urethral stricture, lymphocele, obstructor nerve injury, prolonged anastomotic leak, and other complications.\par \par Plan: Pt will obtain cardiac clearance from his cardiologist before surgery. Olga will contact the patient for the next steps. \par \par I counseled the patient. I discussed the various etiologies of his symptoms. Risks and alternatives were discussed. I answered the patient questions. The patient will follow-up as directed and will contact me with any questions or concerns. Thank you for the opportunity to participate in the care of this patient. I'll keep you updated on his progress. \par  \par Disease Management\par Diagnosis: \par Clinical Staging at Diagnosis: T2 a NX MX \par PSA Level at Diagnosis: 0 -10 ng/mL \par Biopsy: Patient had a biopsy with fusion on 11/11/2022. Measured Prostate Volume (mL): 60. Total number of cores at biopsy: 17. Total # of positives cores: 7. Max % Core Involvement: 70. \par Fusion Biopsy Glendale Score: 6. \par Imaging at Diagnosis:  \par Patient had a Prostate MRI. PI-RADS: 4. \par \par AJCC Staging: \par AJCC Stage (8th Ed): I. \par \par  \par Active Problems\par Acute left-sided low back pain without sciatica (724.2) (M54.50)\par Bilateral leg edema (782.3) (R60.0)\par BPH with urinary obstruction (600.01,599.69) (N40.1,N13.8)\par Chest pain (786.50) (R07.9)\par Essential hypertension (401.9) (I10)\par Hyperlipidemia (272.4) (E78.5)\par PSA elevation (790.93) (R97.20)\par \par Surgical History\par History of Hernia repair\par History of Needle Biopsy Of Prostate\par \par Family History\par Denied: Family history of acute myocardial infarction\par No pertinent family history : Mother, Father\par \par Social History\par Current non-smoker (V49.89) (Z78.9)\par Never a smoker\par Social alcohol use (Z78.9)\par \par Current Meds\par Aspirin EC 81 MG Oral Tablet Delayed Release; TAKE 1 TABLET BY MOUTH EVERY DAY\par Bystolic 10 MG Oral Tablet; TAKE 1 TABLET DAILY\par diazePAM 5 MG Oral Tablet; TAKE 1 TABLET 1 HOUR PRIOR TO PROCEDURE.\par MDD:5mg\par Fleet Enema 7-19 GM/118ML Rectal Enema; USE AS DIRECTED\par Furosemide 40 MG Oral Tablet; TAKE 1 TABLET (40 MG) BY MOUTH DAILY AS NEEDED\par WATERPILL\par Rosuvastatin Calcium 20 MG Oral Tablet; TAKE 1 TABLET DAILY\par Sulfamethoxazole-Trimethoprim 800-160 MG Oral Tablet; TAKE 1 TABLET TWICE DAILY\par UNTIL FINISHED\par Telmisartan 40 MG Oral Tablet; TAKE 1 TABLET ONCE DAILY\par \par Allergies\par gentamicin\par \par Orders\par Prostate cancer, PSA elevation \par Urology Surgery Booking Form Inpatient  .  Status: Need Information - Required\par information,Financial Authorization  Requested for: [id number]\par Priority Category : SEMI-URGENT\par Length of Procedure : 2\par Did the patient receive COVID 19 vaccine? : Yes, Proof submitted\par ERP (Enhanced Recovery Program) : Yes\par Flouro : N\par See Attached Form : Robot\par Special Equipment : Y\par PST : Required\par Medical Clearance : Cardiac\par Admission Type : SDA\par Anesthesia Alert : N\par Anesthesia Type : General\par Procedure Description: : Robotic Prostatectomy and MPLND\par Laterality : N/A\par CPT Code(s): : 49693-Yggjafm/Laparoscopic Bilateral Pelvic Lymphadenectomy\par CPT Code(s): : 80866-Hrdlekg Assisted Laparoscopic Radical Prostatectomy\par Suitable for Amb Surg? : No\par Performing Facility : [facility]\par \par 12/27/22\par F/U Questions about Bx and PSA were discussed and clarified. h e is waiting for cardiology clearance before proceeding th-wht surgery. \par \par E

## 2022-12-29 ENCOUNTER — APPOINTMENT (OUTPATIENT)
Dept: CARDIOLOGY | Facility: CLINIC | Age: 67
End: 2022-12-29

## 2023-01-05 ENCOUNTER — NON-APPOINTMENT (OUTPATIENT)
Age: 68
End: 2023-01-05

## 2023-01-10 ENCOUNTER — APPOINTMENT (OUTPATIENT)
Dept: UROLOGY | Facility: CLINIC | Age: 68
End: 2023-01-10

## 2023-01-11 ENCOUNTER — APPOINTMENT (OUTPATIENT)
Dept: UROLOGY | Facility: CLINIC | Age: 68
End: 2023-01-11

## 2023-01-12 ENCOUNTER — OUTPATIENT (OUTPATIENT)
Dept: OUTPATIENT SERVICES | Facility: HOSPITAL | Age: 68
LOS: 1 days | End: 2023-01-12
Payer: COMMERCIAL

## 2023-01-12 VITALS
WEIGHT: 220.02 LBS | TEMPERATURE: 98 F | OXYGEN SATURATION: 95 % | DIASTOLIC BLOOD PRESSURE: 84 MMHG | SYSTOLIC BLOOD PRESSURE: 144 MMHG | HEIGHT: 68.5 IN | HEART RATE: 60 BPM | RESPIRATION RATE: 18 BRPM

## 2023-01-12 DIAGNOSIS — C61 MALIGNANT NEOPLASM OF PROSTATE: ICD-10-CM

## 2023-01-12 DIAGNOSIS — Z98.890 OTHER SPECIFIED POSTPROCEDURAL STATES: Chronic | ICD-10-CM

## 2023-01-12 DIAGNOSIS — R73.09 OTHER ABNORMAL GLUCOSE: ICD-10-CM

## 2023-01-12 DIAGNOSIS — I10 ESSENTIAL (PRIMARY) HYPERTENSION: ICD-10-CM

## 2023-01-12 LAB
BLD GP AB SCN SERPL QL: NEGATIVE — SIGNIFICANT CHANGE UP
GLUCOSE SERPL-MCNC: 93 MG/DL — SIGNIFICANT CHANGE UP (ref 70–99)
HCT VFR BLD CALC: 47.1 % — SIGNIFICANT CHANGE UP (ref 39–50)
HGB BLD-MCNC: 15.4 G/DL — SIGNIFICANT CHANGE UP (ref 13–17)
MCHC RBC-ENTMCNC: 29.5 PG — SIGNIFICANT CHANGE UP (ref 27–34)
MCHC RBC-ENTMCNC: 32.7 GM/DL — SIGNIFICANT CHANGE UP (ref 32–36)
MCV RBC AUTO: 90.2 FL — SIGNIFICANT CHANGE UP (ref 80–100)
NRBC # BLD: 0 /100 WBCS — SIGNIFICANT CHANGE UP (ref 0–0)
NRBC # FLD: 0 K/UL — SIGNIFICANT CHANGE UP (ref 0–0)
PLATELET # BLD AUTO: 146 K/UL — LOW (ref 150–400)
RBC # BLD: 5.22 M/UL — SIGNIFICANT CHANGE UP (ref 4.2–5.8)
RBC # FLD: 13.6 % — SIGNIFICANT CHANGE UP (ref 10.3–14.5)
RH IG SCN BLD-IMP: POSITIVE — SIGNIFICANT CHANGE UP
WBC # BLD: 4.03 K/UL — SIGNIFICANT CHANGE UP (ref 3.8–10.5)
WBC # FLD AUTO: 4.03 K/UL — SIGNIFICANT CHANGE UP (ref 3.8–10.5)

## 2023-01-12 PROCEDURE — 93010 ELECTROCARDIOGRAM REPORT: CPT

## 2023-01-12 RX ORDER — SODIUM CHLORIDE 9 MG/ML
1000 INJECTION, SOLUTION INTRAVENOUS
Refills: 0 | Status: DISCONTINUED | OUTPATIENT
Start: 2023-01-24 | End: 2023-01-24

## 2023-01-12 NOTE — H&P PST ADULT - NSICDXPASTMEDICALHX_GEN_ALL_CORE_FT
PAST MEDICAL HISTORY:  Enlarged prostate     Hypertension     Prediabetes     Prostate cancer      PAST MEDICAL HISTORY:  Enlarged prostate     Hypertension     Obese     Prediabetes     Prostate cancer

## 2023-01-12 NOTE — H&P PST ADULT - NSANTHOSAYNRD_GEN_A_CORE
No. TALIB screening performed.  STOP BANG Legend: 0-2 = LOW Risk; 3-4 = INTERMEDIATE Risk; 5-8 = HIGH Risk

## 2023-01-12 NOTE — H&P PST ADULT - ASSESSMENT
Pt. is a 66 yo male with prostate cancer.     Repeat CBC sent due to low WBC's.  Discussed with Dr. Rodney.

## 2023-01-12 NOTE — H&P PST ADULT - MUSCULOSKELETAL
negative normal/ROM intact/normal gait/strength 5/5 bilateral upper extremities/strength 5/5 bilateral lower extremities details…

## 2023-01-12 NOTE — H&P PST ADULT - PROBLEM SELECTOR PLAN 1
Pt. is scheduled for a robotic assisted laparoscopic radical prostatectomy, robotic laparoscopic WILLIE pelvic lymphadenectomy 1/24/23.  Pt. verbalized understanding of instructions and that Chlorhexidine is for external use.

## 2023-01-12 NOTE — H&P PST ADULT - PROBLEM SELECTOR PLAN 3
Pt. is asymptomatic.  Pt. had both medical and cardiac clearance.  Both are in the chart. Will request ECHO and stress test results be faxed to PST.

## 2023-01-13 LAB
CULTURE RESULTS: SIGNIFICANT CHANGE UP
SPECIMEN SOURCE: SIGNIFICANT CHANGE UP

## 2023-01-18 RX ORDER — DIAZEPAM 5 MG/1
5 TABLET ORAL
Qty: 1 | Refills: 0 | Status: DISCONTINUED | COMMUNITY
Start: 2022-10-25 | End: 2023-01-18

## 2023-01-19 ENCOUNTER — APPOINTMENT (OUTPATIENT)
Dept: UROLOGY | Facility: CLINIC | Age: 68
End: 2023-01-19

## 2023-01-20 ENCOUNTER — NON-APPOINTMENT (OUTPATIENT)
Age: 68
End: 2023-01-20

## 2023-01-23 ENCOUNTER — TRANSCRIPTION ENCOUNTER (OUTPATIENT)
Age: 68
End: 2023-01-23

## 2023-01-24 ENCOUNTER — APPOINTMENT (OUTPATIENT)
Dept: UROLOGY | Facility: HOSPITAL | Age: 68
End: 2023-01-24

## 2023-01-24 ENCOUNTER — RESULT REVIEW (OUTPATIENT)
Age: 68
End: 2023-01-24

## 2023-01-24 ENCOUNTER — INPATIENT (INPATIENT)
Facility: HOSPITAL | Age: 68
LOS: 0 days | Discharge: HOME | End: 2023-01-25
Attending: UROLOGY | Admitting: UROLOGY
Payer: COMMERCIAL

## 2023-01-24 VITALS
HEIGHT: 68.5 IN | RESPIRATION RATE: 16 BRPM | OXYGEN SATURATION: 97 % | TEMPERATURE: 98 F | WEIGHT: 220.02 LBS | HEART RATE: 53 BPM | SYSTOLIC BLOOD PRESSURE: 134 MMHG | DIASTOLIC BLOOD PRESSURE: 87 MMHG

## 2023-01-24 DIAGNOSIS — Z98.890 OTHER SPECIFIED POSTPROCEDURAL STATES: Chronic | ICD-10-CM

## 2023-01-24 DIAGNOSIS — C61 MALIGNANT NEOPLASM OF PROSTATE: ICD-10-CM

## 2023-01-24 LAB — RH IG SCN BLD-IMP: POSITIVE — SIGNIFICANT CHANGE UP

## 2023-01-24 PROCEDURE — 88309 TISSUE EXAM BY PATHOLOGIST: CPT | Mod: 26

## 2023-01-24 PROCEDURE — 88307 TISSUE EXAM BY PATHOLOGIST: CPT | Mod: 26

## 2023-01-24 PROCEDURE — 88305 TISSUE EXAM BY PATHOLOGIST: CPT | Mod: 26

## 2023-01-24 PROCEDURE — 55866 LAPS SURG PRST8ECT RPBIC RAD: CPT

## 2023-01-24 PROCEDURE — 38571 LAPAROSCOPY LYMPHADENECTOMY: CPT

## 2023-01-24 DEVICE — LIGATING CLIPS WECK HEMOLOK POLYMER LARGE (PURPLE) 6: Type: IMPLANTABLE DEVICE | Status: FUNCTIONAL

## 2023-01-24 RX ORDER — FUROSEMIDE 40 MG
40 TABLET ORAL DAILY
Refills: 0 | Status: DISCONTINUED | OUTPATIENT
Start: 2023-01-24 | End: 2023-01-25

## 2023-01-24 RX ORDER — NIFEDIPINE 30 MG
90 TABLET, EXTENDED RELEASE 24 HR ORAL
Qty: 0 | Refills: 0 | DISCHARGE

## 2023-01-24 RX ORDER — ERGOCALCIFEROL 1.25 MG/1
1 CAPSULE ORAL
Qty: 0 | Refills: 0 | DISCHARGE

## 2023-01-24 RX ORDER — ROSUVASTATIN CALCIUM 5 MG/1
1 TABLET ORAL
Qty: 0 | Refills: 0 | DISCHARGE

## 2023-01-24 RX ORDER — HYDROMORPHONE HYDROCHLORIDE 2 MG/ML
0.5 INJECTION INTRAMUSCULAR; INTRAVENOUS; SUBCUTANEOUS
Refills: 0 | Status: DISCONTINUED | OUTPATIENT
Start: 2023-01-24 | End: 2023-01-24

## 2023-01-24 RX ORDER — SODIUM CHLORIDE 9 MG/ML
1000 INJECTION, SOLUTION INTRAVENOUS
Refills: 0 | Status: DISCONTINUED | OUTPATIENT
Start: 2023-01-24 | End: 2023-01-25

## 2023-01-24 RX ORDER — OXYBUTYNIN CHLORIDE 5 MG
5 TABLET ORAL THREE TIMES A DAY
Refills: 0 | Status: DISCONTINUED | OUTPATIENT
Start: 2023-01-24 | End: 2023-01-25

## 2023-01-24 RX ORDER — ATORVASTATIN CALCIUM 80 MG/1
80 TABLET, FILM COATED ORAL AT BEDTIME
Refills: 0 | Status: DISCONTINUED | OUTPATIENT
Start: 2023-01-24 | End: 2023-01-25

## 2023-01-24 RX ORDER — LIDOCAINE 4 G/100G
1 CREAM TOPICAL THREE TIMES A DAY
Refills: 0 | Status: DISCONTINUED | OUTPATIENT
Start: 2023-01-24 | End: 2023-01-25

## 2023-01-24 RX ORDER — POTASSIUM CHLORIDE 20 MEQ
1 PACKET (EA) ORAL
Qty: 0 | Refills: 0 | DISCHARGE

## 2023-01-24 RX ORDER — MINERAL OIL
30 OIL (ML) MISCELLANEOUS DAILY
Refills: 0 | Status: DISCONTINUED | OUTPATIENT
Start: 2023-01-24 | End: 2023-01-25

## 2023-01-24 RX ORDER — NEBIVOLOL HYDROCHLORIDE 5 MG/1
10 TABLET ORAL DAILY
Refills: 0 | Status: DISCONTINUED | OUTPATIENT
Start: 2023-01-24 | End: 2023-01-25

## 2023-01-24 RX ORDER — ASPIRIN/CALCIUM CARB/MAGNESIUM 324 MG
81 TABLET ORAL
Qty: 0 | Refills: 0 | DISCHARGE

## 2023-01-24 RX ORDER — HYDROMORPHONE HYDROCHLORIDE 2 MG/ML
2 INJECTION INTRAMUSCULAR; INTRAVENOUS; SUBCUTANEOUS EVERY 4 HOURS
Refills: 0 | Status: DISCONTINUED | OUTPATIENT
Start: 2023-01-24 | End: 2023-01-25

## 2023-01-24 RX ORDER — NEBIVOLOL HYDROCHLORIDE 5 MG/1
10 TABLET ORAL
Qty: 0 | Refills: 0 | DISCHARGE

## 2023-01-24 RX ORDER — FUROSEMIDE 40 MG
1 TABLET ORAL
Qty: 0 | Refills: 0 | DISCHARGE

## 2023-01-24 RX ORDER — ACETAMINOPHEN 500 MG
1000 TABLET ORAL EVERY 8 HOURS
Refills: 0 | Status: DISCONTINUED | OUTPATIENT
Start: 2023-01-24 | End: 2023-01-25

## 2023-01-24 RX ORDER — IBUPROFEN 200 MG
400 TABLET ORAL THREE TIMES A DAY
Refills: 0 | Status: DISCONTINUED | OUTPATIENT
Start: 2023-01-24 | End: 2023-01-25

## 2023-01-24 RX ORDER — ONDANSETRON 8 MG/1
4 TABLET, FILM COATED ORAL ONCE
Refills: 0 | Status: DISCONTINUED | OUTPATIENT
Start: 2023-01-24 | End: 2023-01-24

## 2023-01-24 RX ORDER — NIFEDIPINE 30 MG
90 TABLET, EXTENDED RELEASE 24 HR ORAL DAILY
Refills: 0 | Status: DISCONTINUED | OUTPATIENT
Start: 2023-01-24 | End: 2023-01-25

## 2023-01-24 RX ORDER — POTASSIUM CHLORIDE 20 MEQ
10 PACKET (EA) ORAL DAILY
Refills: 0 | Status: DISCONTINUED | OUTPATIENT
Start: 2023-01-24 | End: 2023-01-25

## 2023-01-24 RX ADMIN — Medication 1000 MILLIGRAM(S): at 22:44

## 2023-01-24 RX ADMIN — Medication 1 TABLET(S): at 17:35

## 2023-01-24 RX ADMIN — ATORVASTATIN CALCIUM 80 MILLIGRAM(S): 80 TABLET, FILM COATED ORAL at 22:44

## 2023-01-24 RX ADMIN — SODIUM CHLORIDE 125 MILLILITER(S): 9 INJECTION, SOLUTION INTRAVENOUS at 16:52

## 2023-01-24 RX ADMIN — Medication 30 MILLILITER(S): at 22:44

## 2023-01-24 NOTE — ASU PREOP CHECKLIST - BP NONINVASIVE SYSTOLIC (MM HG)
FT Appropriate for gestational age  Encourage breast feeding  watch daily weights , feeding , voiding and stooling.  Well New Born care including Hearing screen ,  state screen , CCHD.  Iqra Hein MD  Attending Pediatric Hospitalist   [facility]/ [facility] [phone]

## 2023-01-24 NOTE — PATIENT PROFILE ADULT - FALL HARM RISK - UNIVERSAL INTERVENTIONS
Bed in lowest position, wheels locked, appropriate side rails in place/Call bell, personal items and telephone in reach/Instruct patient to call for assistance before getting out of bed or chair/Non-slip footwear when patient is out of bed/[place] to call system/Physically safe environment - no spills, clutter or unnecessary equipment/Purposeful Proactive Rounding/Room/bathroom lighting operational, light cord in reach

## 2023-01-24 NOTE — CHART NOTE - NSCHARTNOTEFT_GEN_A_CORE
Post op Check    Pt seen and examined without complaints. Pain is controlled. Denies SOB/CP/N/V.     Vital Signs Last 24 Hrs  T(C): 36.2 (24 Jan 2023 22:08), Max: 36.7 (24 Jan 2023 19:00)  T(F): 97.2 (24 Jan 2023 22:08), Max: 98.1 (24 Jan 2023 19:00)  HR: 89 (24 Jan 2023 22:08) (53 - 89)  BP: 141/92 (24 Jan 2023 22:08) (108/72 - 141/92)  BP(mean): 88 (24 Jan 2023 19:00) (79 - 94)  RR: 18 (24 Jan 2023 22:08) (12 - 20)  SpO2: 97% (24 Jan 2023 22:08) (95% - 100%)    Parameters below as of 24 Jan 2023 22:08  Patient On (Oxygen Delivery Method): room air        I&O's Summary    24 Jan 2023 07:01  -  24 Jan 2023 22:21  --------------------------------------------------------  IN: 1005 mL / OUT: 310 mL / NET: 695 mL        Physical Exam  Gen: NAD, A&Ox3  Pulm: No respiratory distress, no subcostal retractions  CV: RRR  Abd: Soft, distended, dressing applied to the incision above the umbilicus, no active bleeding  : + hardin, light pink,                 A/P: 67y Male s/p RALP, LND  DVT prophylaxis/OOB  Incentive spirometry  Strict I&O's  Analgesia and antiemetics as needed  Clear liquid diet  AM labs

## 2023-01-24 NOTE — ASU PATIENT PROFILE, ADULT - NSICDXPASTMEDICALHX_GEN_ALL_CORE_FT
PAST MEDICAL HISTORY:  Enlarged prostate     Hypertension     Obese     Prediabetes     Prostate cancer

## 2023-01-24 NOTE — ASU PATIENT PROFILE, ADULT - FALL HARM RISK - UNIVERSAL INTERVENTIONS
Bed in lowest position, wheels locked, appropriate side rails in place/Call bell, personal items and telephone in reach/Instruct patient to call for assistance before getting out of bed or chair/Non-slip footwear when patient is out of bed/[place] to call system/Physically safe environment - no spills, clutter or unnecessary equipment/Purposeful Proactive Rounding/Room/bathroom lighting operational, light cord in reach

## 2023-01-25 ENCOUNTER — TRANSCRIPTION ENCOUNTER (OUTPATIENT)
Age: 68
End: 2023-01-25

## 2023-01-25 VITALS
RESPIRATION RATE: 17 BRPM | TEMPERATURE: 97 F | OXYGEN SATURATION: 99 % | HEART RATE: 58 BPM | DIASTOLIC BLOOD PRESSURE: 74 MMHG | SYSTOLIC BLOOD PRESSURE: 135 MMHG

## 2023-01-25 DIAGNOSIS — R73.03 PREDIABETES: ICD-10-CM

## 2023-01-25 DIAGNOSIS — I10 ESSENTIAL (PRIMARY) HYPERTENSION: ICD-10-CM

## 2023-01-25 DIAGNOSIS — Z29.9 ENCOUNTER FOR PROPHYLACTIC MEASURES, UNSPECIFIED: ICD-10-CM

## 2023-01-25 DIAGNOSIS — E78.5 HYPERLIPIDEMIA, UNSPECIFIED: ICD-10-CM

## 2023-01-25 LAB
ANION GAP SERPL CALC-SCNC: 9 MMOL/L — SIGNIFICANT CHANGE UP (ref 7–14)
BUN SERPL-MCNC: 11 MG/DL — SIGNIFICANT CHANGE UP (ref 7–23)
CALCIUM SERPL-MCNC: 8.9 MG/DL — SIGNIFICANT CHANGE UP (ref 8.4–10.5)
CHLORIDE SERPL-SCNC: 102 MMOL/L — SIGNIFICANT CHANGE UP (ref 98–107)
CO2 SERPL-SCNC: 25 MMOL/L — SIGNIFICANT CHANGE UP (ref 22–31)
CREAT SERPL-MCNC: 1.22 MG/DL — SIGNIFICANT CHANGE UP (ref 0.5–1.3)
EGFR: 65 ML/MIN/1.73M2 — SIGNIFICANT CHANGE UP
GLUCOSE SERPL-MCNC: 96 MG/DL — SIGNIFICANT CHANGE UP (ref 70–99)
HCT VFR BLD CALC: 42.9 % — SIGNIFICANT CHANGE UP (ref 39–50)
HGB BLD-MCNC: 14.1 G/DL — SIGNIFICANT CHANGE UP (ref 13–17)
MCHC RBC-ENTMCNC: 29.6 PG — SIGNIFICANT CHANGE UP (ref 27–34)
MCHC RBC-ENTMCNC: 32.9 GM/DL — SIGNIFICANT CHANGE UP (ref 32–36)
MCV RBC AUTO: 89.9 FL — SIGNIFICANT CHANGE UP (ref 80–100)
NRBC # BLD: 0 /100 WBCS — SIGNIFICANT CHANGE UP (ref 0–0)
NRBC # FLD: 0 K/UL — SIGNIFICANT CHANGE UP (ref 0–0)
PLATELET # BLD AUTO: 193 K/UL — SIGNIFICANT CHANGE UP (ref 150–400)
POTASSIUM SERPL-MCNC: 3.7 MMOL/L — SIGNIFICANT CHANGE UP (ref 3.5–5.3)
POTASSIUM SERPL-SCNC: 3.7 MMOL/L — SIGNIFICANT CHANGE UP (ref 3.5–5.3)
RBC # BLD: 4.77 M/UL — SIGNIFICANT CHANGE UP (ref 4.2–5.8)
RBC # FLD: 13.2 % — SIGNIFICANT CHANGE UP (ref 10.3–14.5)
SODIUM SERPL-SCNC: 136 MMOL/L — SIGNIFICANT CHANGE UP (ref 135–145)
WBC # BLD: 8.12 K/UL — SIGNIFICANT CHANGE UP (ref 3.8–10.5)
WBC # FLD AUTO: 8.12 K/UL — SIGNIFICANT CHANGE UP (ref 3.8–10.5)

## 2023-01-25 PROCEDURE — 99222 1ST HOSP IP/OBS MODERATE 55: CPT

## 2023-01-25 RX ORDER — ACETAMINOPHEN 500 MG
2 TABLET ORAL
Qty: 0 | Refills: 0 | DISCHARGE
Start: 2023-01-25

## 2023-01-25 RX ORDER — LIDOCAINE 4 G/100G
1 CREAM TOPICAL
Qty: 0 | Refills: 0 | DISCHARGE
Start: 2023-01-25

## 2023-01-25 RX ORDER — IBUPROFEN 200 MG
1 TABLET ORAL
Qty: 0 | Refills: 0 | DISCHARGE
Start: 2023-01-25

## 2023-01-25 RX ORDER — HYDROMORPHONE HYDROCHLORIDE 2 MG/ML
1 INJECTION INTRAMUSCULAR; INTRAVENOUS; SUBCUTANEOUS
Qty: 16 | Refills: 0
Start: 2023-01-25 | End: 2023-01-28

## 2023-01-25 RX ORDER — ASPIRIN/CALCIUM CARB/MAGNESIUM 324 MG
81 TABLET ORAL DAILY
Refills: 0 | Status: DISCONTINUED | OUTPATIENT
Start: 2023-01-25 | End: 2023-01-25

## 2023-01-25 RX ORDER — SODIUM CHLORIDE 9 MG/ML
1000 INJECTION, SOLUTION INTRAVENOUS
Refills: 0 | Status: DISCONTINUED | OUTPATIENT
Start: 2023-01-25 | End: 2023-01-25

## 2023-01-25 RX ORDER — MINERAL OIL
30 OIL (ML) MISCELLANEOUS
Qty: 0 | Refills: 0 | DISCHARGE
Start: 2023-01-25

## 2023-01-25 RX ORDER — OXYBUTYNIN CHLORIDE 5 MG
1 TABLET ORAL
Qty: 24 | Refills: 0
Start: 2023-01-25 | End: 2023-02-01

## 2023-01-25 RX ADMIN — Medication 1000 MILLIGRAM(S): at 05:50

## 2023-01-25 RX ADMIN — Medication 40 MILLIGRAM(S): at 05:50

## 2023-01-25 RX ADMIN — Medication 30 MILLILITER(S): at 12:34

## 2023-01-25 RX ADMIN — Medication 90 MILLIGRAM(S): at 05:50

## 2023-01-25 RX ADMIN — SODIUM CHLORIDE 75 MILLILITER(S): 9 INJECTION, SOLUTION INTRAVENOUS at 10:06

## 2023-01-25 RX ADMIN — NEBIVOLOL HYDROCHLORIDE 10 MILLIGRAM(S): 5 TABLET ORAL at 05:50

## 2023-01-25 RX ADMIN — Medication 30 MILLILITER(S): at 14:30

## 2023-01-25 RX ADMIN — Medication 30 MILLILITER(S): at 05:50

## 2023-01-25 RX ADMIN — Medication 1000 MILLIGRAM(S): at 14:29

## 2023-01-25 RX ADMIN — LIDOCAINE 1 APPLICATION(S): 4 CREAM TOPICAL at 14:35

## 2023-01-25 RX ADMIN — Medication 81 MILLIGRAM(S): at 12:34

## 2023-01-25 RX ADMIN — Medication 10 MILLIEQUIVALENT(S): at 12:35

## 2023-01-25 RX ADMIN — Medication 400 MILLIGRAM(S): at 10:05

## 2023-01-25 RX ADMIN — Medication 1 TABLET(S): at 05:50

## 2023-01-25 NOTE — DIETITIAN INITIAL EVALUATION ADULT - PERTINENT MEDS FT
MEDICATIONS  (STANDING):  acetaminophen     Tablet .. 1000 milliGRAM(s) Oral every 8 hours  aluminum hydroxide/magnesium hydroxide/simethicone Suspension 30 milliLiter(s) Oral every 8 hours  aspirin  chewable 81 milliGRAM(s) Oral daily  atorvastatin 80 milliGRAM(s) Oral at bedtime  dextrose 5% + sodium chloride 0.45%. 1000 milliLiter(s) (75 mL/Hr) IV Continuous <Continuous>  furosemide    Tablet 40 milliGRAM(s) Oral daily  ibuprofen  Tablet. 400 milliGRAM(s) Oral three times a day  lidocaine 2% Gel 1 Application(s) Topical three times a day  mineral oil 30 milliLiter(s) Oral daily  nebivolol 10 milliGRAM(s) Oral daily  NIFEdipine XL 90 milliGRAM(s) Oral daily  potassium chloride    Tablet ER 10 milliEquivalent(s) Oral daily  trimethoprim  160 mG/sulfamethoxazole 800 mG 1 Tablet(s) Oral two times a day    MEDICATIONS  (PRN):  HYDROmorphone   Tablet 2 milliGRAM(s) Oral every 4 hours PRN Severe Pain (7 - 10)  oxybutynin 5 milliGRAM(s) Oral three times a day PRN Bladder spasms

## 2023-01-25 NOTE — DISCHARGE NOTE PROVIDER - NSDCFUSCHEDAPPT_GEN_ALL_CORE_FT
[facility]  UROLOGY [address] [place] [address] R  Scheduled Appointment: 01/30/2023    Madan Kathleen  [facility]  UROLOGY [address] [place] [address] R  Scheduled Appointment: 01/30/2023

## 2023-01-25 NOTE — CONSULT NOTE ADULT - PROBLEM SELECTOR RECOMMENDATION 2
c/w home meds bystolic, nifedipine, lasix  preop stress test normal perfusion, normal LV systolic function

## 2023-01-25 NOTE — CONSULT NOTE ADULT - PROBLEM SELECTOR RECOMMENDATION 9
-s/p RALP, LND on 1/24  -postop management per , IVF, pain control, Incentive spirometry, DVT ppx  -doing well, dc home today with hardin, bactrim  -postop lab reviewed, all good (h/h 14/42, cr 1.2)

## 2023-01-25 NOTE — DISCHARGE NOTE PROVIDER - HOSPITAL COURSE
Pt had RALP/LND yesterday; did well; pain controlled; ambulating; passed gas; tolerating clear liquids; labs stable; home today with hardin.

## 2023-01-25 NOTE — DISCHARGE NOTE PROVIDER - NSDCMRMEDTOKEN_GEN_ALL_CORE_FT
acetaminophen 500 mg oral tablet: 2 tab(s) orally every 8 hours  aluminum hydroxide-magnesium hydroxide 200 mg-200 mg/5 mL oral suspension: 30 milliliter(s) orally every 8 hours  aspirin: 81 milligram(s) orally once a day  Bystolic: 10 milligram(s) orally once a day-am  furosemide 40 mg oral tablet: 1 tab(s) orally once a day, am  HYDROmorphone 2 mg oral tablet: 1 tab(s) orally every 6 hours, As Needed -Severe Pain (7 - 10) MDD:4  ibuprofen 400 mg oral tablet: 1 tab(s) orally 3 times a day  lidocaine 2% topical gel with applicator: 1 application topically 3 times a day  mineral oil oral liquid: 30 milliliter(s) orally once a day  NIFEdipine: 90 milligram(s) orally once a day-am  oxybutynin 5 mg oral tablet: 1 tab(s) orally 3 times a day, As needed, Bladder spasms  potassium chloride 10 mEq oral capsule, extended release: 1 cap(s) orally once a day, am  rosuvastatin 20 mg oral tablet: 1 tab(s) orally once a day  sulfamethoxazole-trimethoprim 800 mg-160 mg oral tablet: 1 tab(s) orally 2 times a day  Vitamin D2 1.25 mg (50,000 intl units) oral capsule: 1 cap(s) orally once a week on Sundays

## 2023-01-25 NOTE — DISCHARGE NOTE PROVIDER - NSDCCPCAREPLAN_GEN_ALL_CORE_FT
PRINCIPAL DISCHARGE DIAGNOSIS  Diagnosis: Prostate cancer  Assessment and Plan of Treatment: Drink plenty of fluids.  No heavy lifting (greater than 10 pounds) or straining for 4 to 6 weeks.  You may shower, just pat dry. Do not drive when taking pain medication.   's  office will call to schedule a follow up appointment.  Call the office if you have fever greater than 101,  pain not relieved with pain medication, nausea/vomiting..  .tylenol 1000mg every 8 hrs around the clock for 7 days; ibuprofen 400mg every 8 hrs around the clock for 7 days alternating with tylenol; dilaudid for severe pain  .gaviscon 2 tabs or mylanta 30cc 3X a day; mineral oit 30cc daily until BM's soft and regular  bactrim daily X 7days; lidocaine jelly to penis for comfort 2-3 X a day; oxybutynin 5mg 3X a day as needed for bladder spasms  .clear liquids until passing gas; walk 5-10 min every hour while awake; incentive spirometry 10X an hour

## 2023-01-25 NOTE — CONSULT NOTE ADULT - ASSESSMENT
66 yo male with enlarged prostate and prostate cancer, HTN, pre-DM, HLD, s/p robotic assisted radical prostatectomy, PLND on 1/25.

## 2023-01-25 NOTE — DIETITIAN INITIAL EVALUATION ADULT - PERTINENT LABORATORY DATA
01-25    136  |  102  |  11  ----------------------------<  96  3.7   |  25  |  1.22    Ca    8.9      25 Jan 2023 05:30

## 2023-01-25 NOTE — DISCHARGE NOTE NURSING/CASE MANAGEMENT/SOCIAL WORK - NSDCPNINST_GEN_ALL_CORE
Make a follow up appointment with Dr. Kathleen. Call MD if you develop a fever, or if there is redness, swelling, drainage or pain not relieved by pain medication. No heavy lifting, bending, or straining to move your bowels. Take over the counter stool softeners as needed to prevent constipation which may be caused by pain medication. Palmer and leg bag care as instructed. DRink plenty of liquids.

## 2023-01-25 NOTE — DISCHARGE NOTE NURSING/CASE MANAGEMENT/SOCIAL WORK - NSDCVIVACCINE_GEN_ALL_CORE_FT
Tdap; 26-Nov-2016 11:47; Brigette Calderon (RN); [employer]; [id number]; IntraMuscular; Deltoid Left.; 0.5 milliLiter(s); VIS (VIS Published: 09-May-2013, VIS Presented: 26-Nov-2016);

## 2023-01-25 NOTE — DISCHARGE NOTE PROVIDER - CARE PROVIDER_API CALL
Madan Kathleen)  Urology  [address] [place] [address]  Phone: [phone]  Fax: [phone]  Follow Up Time:

## 2023-01-25 NOTE — CONSULT NOTE ADULT - SUBJECTIVE AND OBJECTIVE BOX
Brenna Solomon MD  Pager [phone]    HPI:  Pt. is a 66 yo male with enlarged prostate and prostate cancer, HTN, pre-DM, HLD, admitted for robotic assisted radical prostatectomy, PLND on 1/25.  Pt reports PSA elevated to 5.3, prostate bx/MRI confirmed prostate ca, denies urinary symptoms.   Pt seen on POD#1, waiting for pass flatus, denies chest pain/sob/dizziness.  Preop echo/stress test normal LV function , no ischemia.      PAST MEDICAL & SURGICAL HISTORY:  Hypertension      Prostate cancer      Enlarged prostate      Prediabetes      Obese      History of incision and drainage          Review of Systems:   CONSTITUTIONAL: No fever, weight loss, or fatigue  EYES: No eye pain, visual disturbances, or discharge  ENMT:  No difficulty hearing, tinnitus, vertigo; No sinus or throat pain  NECK: No pain or stiffness  BREASTS: No pain, masses, or nipple discharge  RESPIRATORY: No cough, wheezing, chills or hemoptysis; No shortness of breath  CARDIOVASCULAR: No chest pain, palpitations, dizziness, or leg swelling  GASTROINTESTINAL: No abdominal or epigastric pain. No nausea, vomiting, or hematemesis; No diarrhea or constipation. No melena or hematochezia.  GENITOURINARY: No dysuria, frequency, hematuria, or incontinence  NEUROLOGICAL: No headaches, memory loss, loss of strength, numbness, or tremors  SKIN: No itching, burning, rashes, or lesions   LYMPH NODES: No enlarged glands  ENDOCRINE: No heat or cold intolerance; No hair loss  MUSCULOSKELETAL: No joint pain or swelling; No muscle, back, or extremity pain  PSYCHIATRIC: No depression, anxiety, mood swings, or difficulty sleeping  HEME/LYMPH: No easy bruising, or bleeding gums  ALLERY AND IMMUNOLOGIC: No hives or eczema    Allergies    gentamicin (Unknown)    Intolerances        Social History: drinks 2-3 cans of light beer daily, denies smoking    FAMILY HISTORY:  No pertinent family history in first degree relatives        Home Medications:  acetaminophen 500 mg oral tablet: 2 tab(s) orally every 8 hours (25 Jan 2023 09:52)  aluminum hydroxide-magnesium hydroxide 200 mg-200 mg/5 mL oral suspension: 30 milliliter(s) orally every 8 hours (25 Jan 2023 09:52)  aspirin: 81 milligram(s) orally once a day (24 Jan 2023 09:42)  Bystolic: 10 milligram(s) orally once a day-am (24 Jan 2023 09:42)  furosemide 40 mg oral tablet: 1 tab(s) orally once a day, am (24 Jan 2023 09:42)  ibuprofen 400 mg oral tablet: 1 tab(s) orally 3 times a day (25 Jan 2023 09:52)  lidocaine 2% topical gel with applicator: 1 application topically 3 times a day (25 Jan 2023 09:52)  mineral oil oral liquid: 30 milliliter(s) orally once a day (25 Jan 2023 09:52)  NIFEdipine: 90 milligram(s) orally once a day-am (24 Jan 2023 09:42)  potassium chloride 10 mEq oral capsule, extended release: 1 cap(s) orally once a day, am (24 Jan 2023 09:42)  rosuvastatin 20 mg oral tablet: 1 tab(s) orally once a day (24 Jan 2023 09:42)  Vitamin D2 1.25 mg (50,000 intl units) oral capsule: 1 cap(s) orally once a week on Sundays (24 Jan 2023 09:42)      MEDICATIONS  (STANDING):  acetaminophen     Tablet .. 1000 milliGRAM(s) Oral every 8 hours  aluminum hydroxide/magnesium hydroxide/simethicone Suspension 30 milliLiter(s) Oral every 8 hours  aspirin  chewable 81 milliGRAM(s) Oral daily  atorvastatin 80 milliGRAM(s) Oral at bedtime  dextrose 5% + sodium chloride 0.45%. 1000 milliLiter(s) (75 mL/Hr) IV Continuous <Continuous>  furosemide    Tablet 40 milliGRAM(s) Oral daily  ibuprofen  Tablet. 400 milliGRAM(s) Oral three times a day  lidocaine 2% Gel 1 Application(s) Topical three times a day  mineral oil 30 milliLiter(s) Oral daily  nebivolol 10 milliGRAM(s) Oral daily  NIFEdipine XL 90 milliGRAM(s) Oral daily  potassium chloride    Tablet ER 10 milliEquivalent(s) Oral daily  trimethoprim  160 mG/sulfamethoxazole 800 mG 1 Tablet(s) Oral two times a day    MEDICATIONS  (PRN):  HYDROmorphone   Tablet 2 milliGRAM(s) Oral every 4 hours PRN Severe Pain (7 - 10)  oxybutynin 5 milliGRAM(s) Oral three times a day PRN Bladder spasms      Vital Signs Last 24 Hrs  T(C): 36.5 (25 Jan 2023 09:48), Max: 36.7 (24 Jan 2023 19:00)  T(F): 97.7 (25 Jan 2023 09:48), Max: 98.1 (24 Jan 2023 19:00)  HR: 58 (25 Jan 2023 09:48) (56 - 94)  BP: 132/75 (25 Jan 2023 09:48) (108/72 - 145/78)  BP(mean): 88 (24 Jan 2023 19:00) (79 - 94)  RR: 18 (25 Jan 2023 09:48) (12 - 20)  SpO2: 98% (25 Jan 2023 09:48) (94% - 100%)    Parameters below as of 25 Jan 2023 09:48  Patient On (Oxygen Delivery Method): room air      CAPILLARY BLOOD GLUCOSE        I&O's Summary    24 Jan 2023 07:01  -  25 Jan 2023 07:00  --------------------------------------------------------  IN: 1005 mL / OUT: 1360 mL / NET: -355 mL    25 Jan 2023 07:01  -  25 Jan 2023 11:02  --------------------------------------------------------  IN: 0 mL / OUT: 500 mL / NET: -500 mL        PHYSICAL EXAM:  GENERAL: NAD, well-developed  HEAD:  Atraumatic, Normocephalic  EYES: EOMI, PERRLA, conjunctiva and sclera clear  NECK: Supple, No JVD  CHEST/LUNG: Clear to auscultation bilaterally; No wheeze  HEART: Regular rate and rhythm; No murmurs, rubs, or gallops  ABDOMEN: Soft, incisional tenderness, mildly distended, wounds clean  : hardin in place  EXTREMITIES:  2+ Peripheral Pulses, No clubbing, cyanosis, or edema  PSYCH: AAOx3  NEUROLOGY: non-focal  SKIN: No rashes or lesions    LABS:                        14.1   8.12  )-----------( 193      ( 25 Jan 2023 05:30 )             42.9     01-25    136  |  102  |  11  ----------------------------<  96  3.7   |  25  |  1.22    Ca    8.9      25 Jan 2023 05:30      Microbiology     RADIOLOGY & ADDITIONAL TESTS:    Imaging Personally Reviewed:    Consultant(s) Notes Reviewed:      Care Discussed with Consultants/Other Providers: emilie Barajas, doing well, going home today

## 2023-01-25 NOTE — DISCHARGE NOTE NURSING/CASE MANAGEMENT/SOCIAL WORK - NSDCPECAREGIVERED_GEN_ALL_CORE
robotic assisted lap prostatectomy  hardin care  hydromorphone  oxybutynin  bactrim  Dr. Kathleen's discharge instruction brochure.

## 2023-01-25 NOTE — DISCHARGE NOTE NURSING/CASE MANAGEMENT/SOCIAL WORK - PATIENT PORTAL LINK FT
You can access the FollowMyHealth Patient Portal offered by [facility] by registering at the following website: [email or web address]. By joining [facility]’s FollowMyHealth portal, you will also be able to view your health information using other applications (apps) compatible with our system.

## 2023-01-25 NOTE — DIETITIAN INITIAL EVALUATION ADULT - OTHER INFO
Per H/P, PST---Pt. is a 68 yo male with enlarged prostate and prostate cancer.    Pt is currently on Clear liquid diet and eating well, requesting more jello as that is all he an eat. Pt is planned for D/C home today, advised can purchase Ensure Clear to supplement. Pt will continue on Clears at home until he has a BM per spouse. Denies chewing, swallowing difficulties or any nausea, vomiting. Pt passed gas but no BM. Pt denies significant weight loss prior to admit.

## 2023-01-25 NOTE — DISCHARGE NOTE NURSING/CASE MANAGEMENT/SOCIAL WORK - NSDPDISTO_GEN_ALL_CORE
Pt. is afebrile and offers no complaints. In no acute distress. Abdominal scope sites: clean, dry and intact. Pt is ambulating, tolerating diet well, and hardin draining adequate amounts of urine./Home

## 2023-01-25 NOTE — PROGRESS NOTE ADULT - SUBJECTIVE AND OBJECTIVE BOX
POD #1  Afeb 145/78 91 94%RA    Pt has no c/o  Abd- soft NT ND; no flatus     midline inc. serosang dressing - changed  Palmer 1L  OOB

## 2023-01-30 ENCOUNTER — APPOINTMENT (OUTPATIENT)
Dept: UROLOGY | Facility: CLINIC | Age: 68
End: 2023-01-30
Payer: COMMERCIAL

## 2023-01-30 ENCOUNTER — OUTPATIENT (OUTPATIENT)
Dept: OUTPATIENT SERVICES | Facility: HOSPITAL | Age: 68
LOS: 1 days | End: 2023-01-30
Payer: COMMERCIAL

## 2023-01-30 VITALS
DIASTOLIC BLOOD PRESSURE: 77 MMHG | RESPIRATION RATE: 18 BRPM | SYSTOLIC BLOOD PRESSURE: 124 MMHG | TEMPERATURE: 98.1 F | HEART RATE: 96 BPM

## 2023-01-30 DIAGNOSIS — R35.0 FREQUENCY OF MICTURITION: ICD-10-CM

## 2023-01-30 DIAGNOSIS — C61 MALIGNANT NEOPLASM OF PROSTATE: ICD-10-CM

## 2023-01-30 DIAGNOSIS — Z98.890 OTHER SPECIFIED POSTPROCEDURAL STATES: Chronic | ICD-10-CM

## 2023-01-30 LAB — SURGICAL PATHOLOGY STUDY: SIGNIFICANT CHANGE UP

## 2023-01-30 PROCEDURE — 51700 IRRIGATION OF BLADDER: CPT | Mod: 58

## 2023-01-30 PROCEDURE — 51700 IRRIGATION OF BLADDER: CPT

## 2023-01-30 RX ORDER — ARGININE 500 MG
500 TABLET ORAL DAILY
Qty: 1 | Refills: 6 | Status: ACTIVE | COMMUNITY
Start: 2023-01-30 | End: 1900-01-01

## 2023-01-30 RX ORDER — SILDENAFIL 50 MG/1
50 TABLET ORAL
Qty: 30 | Refills: 11 | Status: ACTIVE | COMMUNITY
Start: 2023-01-30 | End: 1900-01-01

## 2023-02-01 PROBLEM — R73.03 PREDIABETES: Chronic | Status: ACTIVE | Noted: 2023-01-12

## 2023-02-01 PROBLEM — N40.0 BENIGN PROSTATIC HYPERPLASIA WITHOUT LOWER URINARY TRACT SYMPTOMS: Chronic | Status: ACTIVE | Noted: 2023-01-12

## 2023-02-01 PROBLEM — E66.9 OBESITY, UNSPECIFIED: Chronic | Status: ACTIVE | Noted: 2023-01-12

## 2023-02-01 PROBLEM — C61 MALIGNANT NEOPLASM OF PROSTATE: Chronic | Status: ACTIVE | Noted: 2023-01-12

## 2023-02-01 RX ORDER — GABAPENTIN 100 MG/1
100 CAPSULE ORAL
Qty: 1 | Refills: 0 | Status: COMPLETED | COMMUNITY
Start: 2023-01-18 | End: 2023-02-01

## 2023-02-01 RX ORDER — ACETAMINOPHEN 500 MG/1
500 TABLET, COATED ORAL
Qty: 2 | Refills: 0 | Status: COMPLETED | COMMUNITY
Start: 2023-01-18 | End: 2023-02-01

## 2023-02-09 NOTE — ASU PATIENT PROFILE, ADULT - FALL HARM RISK - FALLEN IN PAST
CERTIFICATE OF WORK      2/9/2023      Re:   Klaus Rock  [address]  [place] [address]                    This is to certify that Klaus Rock was seen at [facility] today and can return to regular work on 2/13/2023.      RESTRICTIONS: none          SIGNATURE:___________________________________________,   2/9/2023      Jed Cardoso MD          [facility]  [address]  [place] [address] [phone]  
No

## 2023-03-14 ENCOUNTER — NON-APPOINTMENT (OUTPATIENT)
Age: 68
End: 2023-03-14

## 2023-04-16 ENCOUNTER — LABORATORY RESULT (OUTPATIENT)
Age: 68
End: 2023-04-16

## 2023-04-16 ENCOUNTER — NON-APPOINTMENT (OUTPATIENT)
Age: 68
End: 2023-04-16

## 2023-04-17 ENCOUNTER — APPOINTMENT (OUTPATIENT)
Dept: DERMATOLOGY | Facility: CLINIC | Age: 68
End: 2023-04-17
Payer: COMMERCIAL

## 2023-04-17 DIAGNOSIS — D48.5 NEOPLASM OF UNCERTAIN BEHAVIOR OF SKIN: ICD-10-CM

## 2023-04-17 DIAGNOSIS — R23.8 OTHER SKIN CHANGES: ICD-10-CM

## 2023-04-17 PROCEDURE — 11102 TANGNTL BX SKIN SINGLE LES: CPT

## 2023-04-17 PROCEDURE — 99213 OFFICE O/P EST LOW 20 MIN: CPT | Mod: 25

## 2023-04-25 ENCOUNTER — NON-APPOINTMENT (OUTPATIENT)
Age: 68
End: 2023-04-25

## 2023-05-01 LAB — PSA, POST - PROSTATECTOMY: <0.01 NG/ML

## 2023-05-02 ENCOUNTER — APPOINTMENT (OUTPATIENT)
Dept: UROLOGY | Facility: CLINIC | Age: 68
End: 2023-05-02
Payer: COMMERCIAL

## 2023-05-02 VITALS
HEART RATE: 57 BPM | RESPIRATION RATE: 16 BRPM | DIASTOLIC BLOOD PRESSURE: 71 MMHG | SYSTOLIC BLOOD PRESSURE: 120 MMHG | TEMPERATURE: 97.2 F

## 2023-05-02 PROCEDURE — 99214 OFFICE O/P EST MOD 30 MIN: CPT

## 2023-05-02 NOTE — HISTORY OF PRESENT ILLNESS
[FreeTextEntry1] : Prostate Cancer \par 12/19/2022: Reason for visit: Prostate Cancer Talk\par Mr. ESTEBAN is a 67 year old male presenting today for consideration of curative therapy for prostate cancer. He works in [employer]. He is accompanied by his wife. He reports his erection is currently inadequate. Hx of HTN. Denies Hx of DM, CAD and MI. Denies history of any abdominal surgery. The patient asked about biopsying his lymph nodes and i explained the lymph nodes are not biopsied, but a sample of the common landing sites of the landing site will be sent for analysis after the surgery. I also informed the patient that I reviewed the MRI and it showed no extraprostatic extension, seminal vesicle invasion. he is taking aspirin and I instructed him to stop a week before surgery. He reports he stopped drinking alcohol 2 weeks ago. He used to drink 3 beers a day on average. He has subsisted it with zero alcoholic beverage. I recommended him for stopping and advised him to stop to avoid complications after the surgery. He reports swelling in the lower extremities managed with furosemide. He reduced his Bystolic intake by half and now takes Bystolic 5 mg tablet one daily and i was agreeable he keeps taking 5 mg. \par \par -PSA Screen 2.72 ng/mL on 11/02/2022\par -Clinical Stage: T2\par -Glenroy 7 (3+4); 7/17 positive cores all on the right side. Biopsy on 11/11/2022. \par Gleasons Grade Group 2 \par -MRI on 10/18/2022 showed "Right, posterior medial (PZpm), [id number]. PIRADS 4 - High\par \par January 2023\par Radical Single Port Prostatectomy \par [id number] prostate cancer [place] 3+4 negative margins \par \par \par 5/2/23\par PSA undetectable\par No BRENDAN\par No erection complaints or concerns\par \par IMP: [id number] prostate cancer\par \par Plan: PSA anF/U yearly earlier as needed

## 2023-05-02 NOTE — DISEASE MANAGEMENT
[a] : a [0-10] : 0 -10 ng/mL [Biopsy with Fusion] : Patient had a biopsy with fusion on [6] : Fusion Biopsy Glenroy Score: 6 [] : Patient had a Prostate MRI [4] : 4 [I] : I [Radical Prostatectomy] : Radical Prostatectomy [Patient had a radical prostatectomy] : Patient had a radical prostatectomy  [7(3+4)] : Glenroy Score 7(3+4) [Negative] : Negative margins [2] : T2 [0] : N0 [X] : MX [BiopsyDate] : 11/11/2022 [MeasuredProstateVolume] : 60 [TotalCores] : 17 [TotalPositiveCores] : 7 [MaxCoreInvolvement] : 70 [RadicalProstatectomyDate] : 1/2023 [TotalNumberofnodesresected] : 4 [PositiveNodes] : 0

## 2023-05-31 ENCOUNTER — APPOINTMENT (OUTPATIENT)
Dept: CARDIOLOGY | Facility: CLINIC | Age: 68
End: 2023-05-31

## 2023-08-28 ENCOUNTER — APPOINTMENT (OUTPATIENT)
Dept: CARDIOLOGY | Facility: CLINIC | Age: 68
End: 2023-08-28

## 2023-10-18 ENCOUNTER — OUTPATIENT (OUTPATIENT)
Dept: OUTPATIENT SERVICES | Facility: HOSPITAL | Age: 68
LOS: 1 days | End: 2023-10-18
Payer: COMMERCIAL

## 2023-10-18 ENCOUNTER — APPOINTMENT (OUTPATIENT)
Dept: ULTRASOUND IMAGING | Facility: CLINIC | Age: 68
End: 2023-10-18
Payer: COMMERCIAL

## 2023-10-18 ENCOUNTER — RESULT REVIEW (OUTPATIENT)
Age: 68
End: 2023-10-18

## 2023-10-18 DIAGNOSIS — Z98.890 OTHER SPECIFIED POSTPROCEDURAL STATES: Chronic | ICD-10-CM

## 2023-10-18 DIAGNOSIS — R74.01 ELEVATION OF LEVELS OF LIVER TRANSAMINASE LEVELS: ICD-10-CM

## 2023-10-18 PROCEDURE — 76705 ECHO EXAM OF ABDOMEN: CPT | Mod: 26

## 2023-10-18 PROCEDURE — 76705 ECHO EXAM OF ABDOMEN: CPT

## 2023-10-22 NOTE — ASU PREOP CHECKLIST - PATIENT SENT TO
Patient requests all Lab, Cardiology, and Radiology Results on their Discharge Instructions
operating room

## 2024-04-18 ENCOUNTER — APPOINTMENT (OUTPATIENT)
Dept: CARDIOLOGY | Facility: CLINIC | Age: 69
End: 2024-04-18

## 2024-07-28 ENCOUNTER — NON-APPOINTMENT (OUTPATIENT)
Age: 69
End: 2024-07-28

## 2024-07-29 ENCOUNTER — APPOINTMENT (OUTPATIENT)
Dept: DERMATOLOGY | Facility: CLINIC | Age: 69
End: 2024-07-29
Payer: COMMERCIAL

## 2024-07-29 VITALS — HEIGHT: 70 IN | WEIGHT: 225 LBS | BODY MASS INDEX: 32.21 KG/M2

## 2024-07-29 DIAGNOSIS — L82.1 OTHER SEBORRHEIC KERATOSIS: ICD-10-CM

## 2024-07-29 DIAGNOSIS — L91.8 OTHER HYPERTROPHIC DISORDERS OF THE SKIN: ICD-10-CM

## 2024-07-29 PROCEDURE — 99213 OFFICE O/P EST LOW 20 MIN: CPT

## 2024-07-29 NOTE — PHYSICAL EXAM
[Alert] : alert [Oriented x 3] : ~L oriented x 3 [Well Nourished] : well nourished [Conjunctiva Non-injected] : conjunctiva non-injected [No Visual Lymphadenopathy] : no visual  lymphadenopathy [No Clubbing] : no clubbing [No Edema] : no edema [No Bromhidrosis] : no bromhidrosis [No Chromhidrosis] : no chromhidrosis [FreeTextEntry3] : Stuck-on and pedunculated papules on the neck and upper chest

## 2024-07-29 NOTE — HISTORY OF PRESENT ILLNESS
[FreeTextEntry1] : bumps on the neck [de-identified] : 68M here for bumps on the neck. Present x months. Desires removal

## 2024-10-03 ENCOUNTER — APPOINTMENT (OUTPATIENT)
Dept: DERMATOLOGY | Facility: CLINIC | Age: 69
End: 2024-10-03

## 2024-11-25 ENCOUNTER — APPOINTMENT (OUTPATIENT)
Dept: DERMATOLOGY | Facility: CLINIC | Age: 69
End: 2024-11-25

## 2025-02-10 NOTE — H&P PST ADULT - NSICDXNOFAMILYHX_GEN_ALL_CORE
<-- Click to add NO pertinent Family History
Detail Level: Detailed
Depth Of Biopsy: dermis
Was A Bandage Applied: Yes
Size Of Lesion In Cm: 1.1
X Size Of Lesion In Cm: 0
Biopsy Type: H and E
Biopsy Method: Dermablade
Anesthesia Type: 1% lidocaine with epinephrine
Anesthesia Volume In Cc: 0.8
Hemostasis: Electrodesiccation and Aluminum Chloride
Wound Care: Petrolatum
Dressing: bandage
Destruction After The Procedure: No
Type Of Destruction Used: Curettage
Curettage Text: The wound bed was treated with curettage after the biopsy was performed.
Cryotherapy Text: The wound bed was treated with cryotherapy after the biopsy was performed.
Electrodesiccation Text: The wound bed was treated with electrodesiccation after the biopsy was performed.
Electrodesiccation And Curettage Text: The wound bed was treated with electrodesiccation and curettage after the biopsy was performed.
Silver Nitrate Text: The wound bed was treated with silver nitrate after the biopsy was performed.
Lab: [id number]
Lab Facility: [id number]
Medical Necessity Information: It is in your best interest to select a reason for this procedure from the list below. All of these items fulfill various CMS LCD requirements except the new and changing color options.
Consent: Verbal consent was obtained and risks were reviewed including but not limited to scarring, infection, bleeding, scabbing, incomplete removal, nerve damage and allergy to anesthesia.
Post-Care Instructions: I reviewed with the patient in detail post-care instructions. Patient is to keep the biopsy site dry overnight, and then apply bacitracin twice daily until healed. Patient may apply hydrogen peroxide soaks to remove any crusting.
Notification Instructions: Patient will be notified of biopsy results. However, patient instructed to call the office if not contacted within 2 weeks.
Billing Type: Third-Party Bill
Information: Selecting Yes will display possible errors in your note based on the variables you have selected. This validation is only offered as a suggestion for you. PLEASE NOTE THAT THE VALIDATION TEXT WILL BE REMOVED WHEN YOU FINALIZE YOUR NOTE. IF YOU WANT TO FAX A PRELIMINARY NOTE YOU WILL NEED TO TOGGLE THIS TO 'NO' IF YOU DO NOT WANT IT IN YOUR FAXED NOTE.

## 2025-02-26 ENCOUNTER — TRANSCRIPTION ENCOUNTER (OUTPATIENT)
Age: 70
End: 2025-02-26

## 2025-03-17 ENCOUNTER — APPOINTMENT (OUTPATIENT)
Dept: DERMATOLOGY | Facility: CLINIC | Age: 70
End: 2025-03-17
Payer: COMMERCIAL

## 2025-03-17 DIAGNOSIS — L91.8 OTHER HYPERTROPHIC DISORDERS OF THE SKIN: ICD-10-CM

## 2025-03-17 PROCEDURE — D0125: CPT

## 2025-05-20 ENCOUNTER — APPOINTMENT (OUTPATIENT)
Dept: VASCULAR SURGERY | Facility: CLINIC | Age: 70
End: 2025-05-20
Payer: COMMERCIAL

## 2025-05-20 DIAGNOSIS — R60.0 LOCALIZED EDEMA: ICD-10-CM

## 2025-05-20 PROCEDURE — 99203 OFFICE O/P NEW LOW 30 MIN: CPT

## 2025-05-20 PROCEDURE — 93970 EXTREMITY STUDY: CPT

## (undated) DEVICE — INSUFFLATION NDL COVIDIEN SURGINEEDLE VERESS 120MM

## (undated) DEVICE — GOWN XL

## (undated) DEVICE — ELCTR GROUNDING PAD ADULT COVIDIEN

## (undated) DEVICE — TUBING AIRSEAL TRI-LUMEN FILTERED

## (undated) DEVICE — SUT VLOC 90 3-0 6" CV-23 UNDYED

## (undated) DEVICE — FOLEY CATH 2-WAY 16FR 5CC SILICONE

## (undated) DEVICE — SUT MONOCRYL 4-0 27" PS-2 UNDYED

## (undated) DEVICE — SUT VICRYL 1 36" CT-1 UNDYED

## (undated) DEVICE — SP DRAPE ARM

## (undated) DEVICE — DRAPE IOBAN 33" X 23"

## (undated) DEVICE — SP KIT LAP ENTRYGUIDE STND L100X25MM

## (undated) DEVICE — DRAIN RESERVOIR FOR JACKSON PRATT 100CC CARDINAL

## (undated) DEVICE — SP MARYLAND BIPOLAR FORCEP 6MM

## (undated) DEVICE — SYR CATH TIP 2 OZ

## (undated) DEVICE — DRSG DERMABOND 0.7ML

## (undated) DEVICE — SUT PROLENE 0 30" CT-1

## (undated) DEVICE — LUBRICATING JELLY ONESHOT 1.25OZ

## (undated) DEVICE — SUT POLYSORB 0 60" TIES UNDYED

## (undated) DEVICE — DRSG MASTISOL

## (undated) DEVICE — PACK ROBOTIC LIJ

## (undated) DEVICE — SUT VICRYL 0 27" UR-6

## (undated) DEVICE — D HELP - CLEARVIEW CLEARIFY SYSTEM

## (undated) DEVICE — GOWN XXXL

## (undated) DEVICE — PACK PERI GYN

## (undated) DEVICE — SP MONOPOLAR SCISSOR CURVED 6MM

## (undated) DEVICE — POSITIONER FOAM HEAD CRADLE (PINK)

## (undated) DEVICE — SUT VLOC 90 3-0 6" CV-23 VIOLET

## (undated) DEVICE — PREP BETADINE SPONGE STICKS

## (undated) DEVICE — SP SHEATH

## (undated) DEVICE — DRAIN JACKSON PRATT 7MM FLAT FULL NO TROCAR

## (undated) DEVICE — TUBING STRYKEFLOW II SUCTION / IRRIGATOR

## (undated) DEVICE — POSITIONER PINK PAD PIGAZZI SYSTEM

## (undated) DEVICE — FOLEY HOLDER STATLOCK 2 WAY ADULT

## (undated) DEVICE — POSITIONER PURPLE ARM ONE STEP (LARGE)

## (undated) DEVICE — TIP SCISSOR MCS TIP 10/PK

## (undated) DEVICE — GLV 7.5 PROTEXIS (CREAM) MICRO

## (undated) DEVICE — SUT NYLON 2-0 18" FS

## (undated) DEVICE — WARMING BLANKET UPPER ADULT

## (undated) DEVICE — TROCAR GELPOINT MINI ADVANCED

## (undated) DEVICE — DRSG TEGADERM 2.5X3"

## (undated) DEVICE — VENODYNE/SCD SLEEVE CALF MEDIUM

## (undated) DEVICE — SP NEEDLE DRIVER 6MM

## (undated) DEVICE — FOLEY CATH 2-WAY 20FR 5CC UNCOATED SILICONE

## (undated) DEVICE — SOL IRR BAG H2O 3000ML

## (undated) DEVICE — BAG URINE W METER 2L

## (undated) DEVICE — Device

## (undated) DEVICE — ENDOCATCH 10MM SPECIMEN POUCH

## (undated) DEVICE — SP COVER CAMERA SHEATH

## (undated) DEVICE — TROCAR SURGIQUEST AIRSEAL 12MMX100MM